# Patient Record
Sex: FEMALE | Race: ASIAN | NOT HISPANIC OR LATINO
[De-identification: names, ages, dates, MRNs, and addresses within clinical notes are randomized per-mention and may not be internally consistent; named-entity substitution may affect disease eponyms.]

---

## 2018-03-05 ENCOUNTER — APPOINTMENT (OUTPATIENT)
Dept: OPHTHALMOLOGY | Facility: CLINIC | Age: 68
End: 2018-03-05
Payer: MEDICARE

## 2018-03-05 PROBLEM — Z00.00 ENCOUNTER FOR PREVENTIVE HEALTH EXAMINATION: Noted: 2018-03-05

## 2018-03-05 PROCEDURE — 92004 COMPRE OPH EXAM NEW PT 1/>: CPT

## 2018-03-06 ENCOUNTER — APPOINTMENT (OUTPATIENT)
Dept: OPHTHALMOLOGY | Facility: CLINIC | Age: 68
End: 2018-03-06

## 2018-03-12 ENCOUNTER — APPOINTMENT (OUTPATIENT)
Dept: OPHTHALMOLOGY | Facility: CLINIC | Age: 68
End: 2018-03-12
Payer: MEDICARE

## 2018-03-12 PROCEDURE — 92012 INTRM OPH EXAM EST PATIENT: CPT

## 2018-04-02 ENCOUNTER — APPOINTMENT (OUTPATIENT)
Dept: OPHTHALMOLOGY | Facility: CLINIC | Age: 68
End: 2018-04-02

## 2018-07-24 ENCOUNTER — APPOINTMENT (OUTPATIENT)
Dept: OBGYN | Facility: CLINIC | Age: 68
End: 2018-07-24

## 2018-07-24 ENCOUNTER — APPOINTMENT (OUTPATIENT)
Dept: OBGYN | Facility: CLINIC | Age: 68
End: 2018-07-24
Payer: MEDICARE

## 2018-07-24 VITALS
HEART RATE: 74 BPM | DIASTOLIC BLOOD PRESSURE: 74 MMHG | HEIGHT: 59 IN | WEIGHT: 146 LBS | BODY MASS INDEX: 29.43 KG/M2 | OXYGEN SATURATION: 97 % | TEMPERATURE: 97.9 F | SYSTOLIC BLOOD PRESSURE: 122 MMHG

## 2018-07-24 DIAGNOSIS — Z78.9 OTHER SPECIFIED HEALTH STATUS: ICD-10-CM

## 2018-07-24 DIAGNOSIS — Z82.61 FAMILY HISTORY OF ARTHRITIS: ICD-10-CM

## 2018-07-24 DIAGNOSIS — Z85.3 PERSONAL HISTORY OF MALIGNANT NEOPLASM OF BREAST: ICD-10-CM

## 2018-07-24 DIAGNOSIS — Z86.79 PERSONAL HISTORY OF OTHER DISEASES OF THE CIRCULATORY SYSTEM: ICD-10-CM

## 2018-07-24 DIAGNOSIS — Z86.39 PERSONAL HISTORY OF OTHER ENDOCRINE, NUTRITIONAL AND METABOLIC DISEASE: ICD-10-CM

## 2018-07-24 PROCEDURE — 99397 PER PM REEVAL EST PAT 65+ YR: CPT

## 2018-07-24 RX ORDER — ATENOLOL 50 MG/1
50 TABLET ORAL
Refills: 0 | Status: ACTIVE | COMMUNITY

## 2018-07-24 RX ORDER — ROSUVASTATIN CALCIUM 10 MG/1
10 TABLET, FILM COATED ORAL
Refills: 0 | Status: ACTIVE | COMMUNITY

## 2018-07-24 RX ORDER — IRON HEME POLYPEPTIDE/FOLIC AC 12-1MG
125 MCG TABLET ORAL
Refills: 0 | Status: ACTIVE | COMMUNITY

## 2018-07-24 RX ORDER — QUINAPRIL HYDROCHLORIDE 10 MG/1
10 TABLET, FILM COATED ORAL
Refills: 0 | Status: ACTIVE | COMMUNITY

## 2018-07-25 LAB
C TRACH RRNA SPEC QL NAA+PROBE: NOT DETECTED
CANDIDA VAG CYTO: NOT DETECTED
G VAGINALIS+PREV SP MTYP VAG QL MICRO: NOT DETECTED
HPV HIGH+LOW RISK DNA PNL CVX: NOT DETECTED
N GONORRHOEA RRNA SPEC QL NAA+PROBE: NOT DETECTED
SOURCE TP AMPLIFICATION: NORMAL
T VAGINALIS VAG QL WET PREP: NOT DETECTED

## 2018-07-26 LAB — BACTERIA UR CULT: NORMAL

## 2018-08-01 LAB — CYTOLOGY CVX/VAG DOC THIN PREP: NORMAL

## 2018-09-18 ENCOUNTER — APPOINTMENT (OUTPATIENT)
Dept: OBGYN | Facility: CLINIC | Age: 68
End: 2018-09-18

## 2018-09-20 ENCOUNTER — APPOINTMENT (OUTPATIENT)
Dept: OBGYN | Facility: CLINIC | Age: 68
End: 2018-09-20
Payer: MEDICARE

## 2018-09-20 ENCOUNTER — APPOINTMENT (OUTPATIENT)
Dept: UROLOGY | Facility: CLINIC | Age: 68
End: 2018-09-20
Payer: MEDICARE

## 2018-09-20 VITALS
OXYGEN SATURATION: 98 % | WEIGHT: 145 LBS | BODY MASS INDEX: 29.23 KG/M2 | HEIGHT: 59 IN | SYSTOLIC BLOOD PRESSURE: 122 MMHG | DIASTOLIC BLOOD PRESSURE: 80 MMHG | TEMPERATURE: 98 F | HEART RATE: 84 BPM

## 2018-09-20 VITALS
SYSTOLIC BLOOD PRESSURE: 122 MMHG | TEMPERATURE: 98 F | OXYGEN SATURATION: 98 % | HEIGHT: 59 IN | WEIGHT: 145 LBS | BODY MASS INDEX: 29.23 KG/M2 | DIASTOLIC BLOOD PRESSURE: 80 MMHG | HEART RATE: 84 BPM

## 2018-09-20 DIAGNOSIS — Z01.419 ENCOUNTER FOR GYNECOLOGICAL EXAMINATION (GENERAL) (ROUTINE) W/OUT ABNORMAL FINDINGS: ICD-10-CM

## 2018-09-20 DIAGNOSIS — Z78.9 OTHER SPECIFIED HEALTH STATUS: ICD-10-CM

## 2018-09-20 DIAGNOSIS — M54.5 LOW BACK PAIN: ICD-10-CM

## 2018-09-20 DIAGNOSIS — G89.29 LOW BACK PAIN: ICD-10-CM

## 2018-09-20 DIAGNOSIS — Z11.3 ENCOUNTER FOR SCREENING FOR INFECTIONS WITH A PREDOMINANTLY SEXUAL MODE OF TRANSMISSION: ICD-10-CM

## 2018-09-20 PROCEDURE — 51798 US URINE CAPACITY MEASURE: CPT

## 2018-09-20 PROCEDURE — 99203 OFFICE O/P NEW LOW 30 MIN: CPT | Mod: 25

## 2018-09-20 PROCEDURE — 99213 OFFICE O/P EST LOW 20 MIN: CPT

## 2018-09-20 RX ORDER — TERCONAZOLE 8 MG/G
0.8 CREAM VAGINAL
Qty: 1 | Refills: 0 | Status: DISCONTINUED | COMMUNITY
Start: 2018-07-24 | End: 2018-09-20

## 2018-09-20 RX ORDER — GLYCERIN/MIN OIL/POLYCARBOPHIL
GEL WITH APPLICATOR (GRAM) VAGINAL
Qty: 1 | Refills: 2 | Status: ACTIVE | COMMUNITY
Start: 2018-09-20 | End: 1900-01-01

## 2018-09-27 LAB
BACTERIA UR CULT: NORMAL
CANDIDA VAG CYTO: NOT DETECTED
G VAGINALIS+PREV SP MTYP VAG QL MICRO: NOT DETECTED
T VAGINALIS VAG QL WET PREP: NOT DETECTED

## 2018-10-04 ENCOUNTER — OTHER (OUTPATIENT)
Age: 68
End: 2018-10-04

## 2018-10-04 LAB
APPEARANCE: CLEAR
BACTERIA: NEGATIVE
BILIRUBIN URINE: NEGATIVE
BLOOD URINE: NEGATIVE
COLOR: YELLOW
GLUCOSE QUALITATIVE U: NEGATIVE MG/DL
KETONES URINE: NEGATIVE
LEUKOCYTE ESTERASE URINE: NEGATIVE
MICROSCOPIC-UA: NORMAL
NITRITE URINE: NEGATIVE
PH URINE: 7
PROTEIN URINE: NEGATIVE MG/DL
RED BLOOD CELLS URINE: 1 /HPF
SPECIFIC GRAVITY URINE: 1
SQUAMOUS EPITHELIAL CELLS: 0 /HPF
UROBILINOGEN URINE: NEGATIVE MG/DL
WHITE BLOOD CELLS URINE: 0 /HPF

## 2018-10-25 ENCOUNTER — APPOINTMENT (OUTPATIENT)
Dept: UROLOGY | Facility: CLINIC | Age: 68
End: 2018-10-25
Payer: MEDICARE

## 2018-10-25 VITALS — SYSTOLIC BLOOD PRESSURE: 140 MMHG | DIASTOLIC BLOOD PRESSURE: 80 MMHG

## 2018-10-25 PROCEDURE — 99213 OFFICE O/P EST LOW 20 MIN: CPT

## 2018-10-25 RX ORDER — OXYBUTYNIN CHLORIDE 5 MG/1
5 TABLET, EXTENDED RELEASE ORAL DAILY
Qty: 90 | Refills: 3 | Status: ACTIVE | COMMUNITY
Start: 2018-10-04 | End: 1900-01-01

## 2018-10-26 RX ORDER — KETOCONAZOLE 20.5 MG/ML
2 SHAMPOO, SUSPENSION TOPICAL
Qty: 120 | Refills: 0 | Status: COMPLETED | COMMUNITY
Start: 2018-10-04

## 2018-10-26 RX ORDER — CROMOLYN SODIUM 40 MG/ML
4 SOLUTION/ DROPS OPHTHALMIC
Qty: 10 | Refills: 0 | Status: COMPLETED | COMMUNITY
Start: 2018-10-20

## 2018-10-26 RX ORDER — TRIAMCINOLONE ACETONIDE 1 MG/G
0.1 CREAM TOPICAL
Qty: 45 | Refills: 0 | Status: COMPLETED | COMMUNITY
Start: 2018-10-04

## 2018-11-13 ENCOUNTER — APPOINTMENT (OUTPATIENT)
Dept: OBGYN | Facility: CLINIC | Age: 68
End: 2018-11-13
Payer: MEDICARE

## 2018-11-13 VITALS
WEIGHT: 148 LBS | HEART RATE: 68 BPM | HEIGHT: 59 IN | OXYGEN SATURATION: 98 % | SYSTOLIC BLOOD PRESSURE: 150 MMHG | BODY MASS INDEX: 29.84 KG/M2 | DIASTOLIC BLOOD PRESSURE: 80 MMHG | TEMPERATURE: 98.2 F

## 2018-11-13 DIAGNOSIS — R39.9 UNSPECIFIED SYMPTOMS AND SIGNS INVOLVING THE GENITOURINARY SYSTEM: ICD-10-CM

## 2018-11-13 PROCEDURE — 99213 OFFICE O/P EST LOW 20 MIN: CPT

## 2018-11-16 LAB
CANDIDA VAG CYTO: NOT DETECTED
G VAGINALIS+PREV SP MTYP VAG QL MICRO: NOT DETECTED
T VAGINALIS VAG QL WET PREP: NOT DETECTED

## 2019-05-03 ENCOUNTER — APPOINTMENT (OUTPATIENT)
Dept: OBGYN | Facility: CLINIC | Age: 69
End: 2019-05-03
Payer: MEDICARE

## 2019-05-03 VITALS
BODY MASS INDEX: 30.24 KG/M2 | DIASTOLIC BLOOD PRESSURE: 70 MMHG | OXYGEN SATURATION: 98 % | WEIGHT: 150 LBS | TEMPERATURE: 98.3 F | HEART RATE: 69 BPM | SYSTOLIC BLOOD PRESSURE: 130 MMHG | HEIGHT: 59 IN

## 2019-05-03 DIAGNOSIS — A60.00 HERPESVIRAL INFECTION OF UROGENITAL SYSTEM, UNSPECIFIED: ICD-10-CM

## 2019-05-03 DIAGNOSIS — R39.9 UNSPECIFIED SYMPTOMS AND SIGNS INVOLVING THE GENITOURINARY SYSTEM: ICD-10-CM

## 2019-05-03 DIAGNOSIS — Z78.0 ASYMPTOMATIC MENOPAUSAL STATE: ICD-10-CM

## 2019-05-03 DIAGNOSIS — N76.0 ACUTE VAGINITIS: ICD-10-CM

## 2019-05-03 DIAGNOSIS — N89.8 OTHER SPECIFIED NONINFLAMMATORY DISORDERS OF VAGINA: ICD-10-CM

## 2019-05-03 DIAGNOSIS — Z85.3 PERSONAL HISTORY OF MALIGNANT NEOPLASM OF BREAST: ICD-10-CM

## 2019-05-03 PROCEDURE — 99213 OFFICE O/P EST LOW 20 MIN: CPT

## 2019-05-03 RX ORDER — VALACYCLOVIR 500 MG/1
500 TABLET, FILM COATED ORAL
Qty: 6 | Refills: 2 | Status: ACTIVE | COMMUNITY
Start: 2019-05-03 | End: 1900-01-01

## 2019-05-03 RX ORDER — ACYCLOVIR 50 MG/G
5 OINTMENT TOPICAL 4 TIMES DAILY
Qty: 1 | Refills: 1 | Status: ACTIVE | COMMUNITY
Start: 2019-05-03 | End: 1900-01-01

## 2019-05-03 NOTE — CHIEF COMPLAINT
[Follow Up] : follow up GYN visit [FreeTextEntry1] : Patient is complaining of per-rectal irritation x 10 days, she has hx/o genital HSV and feels like the same irritation currently.  Patient is also complaining of urinary symptoms along with the rectal irritation.

## 2019-05-03 NOTE — PHYSICAL EXAM
[Awake] : awake [Acute Distress] : no acute distress [Alert] : alert [Oriented x3] : oriented to person, place, and time [Depressed Mood] : not depressed [Flat Affect] : affect not flat [Labia Minora] : labia minora [Labia Majora] : labia major [Normal] : clitoris [Atrophy] : atrophy [Erythema] : no erythema [Discharge] : a  ~M vaginal discharge was present [Green] : green [Scant] : scant [Watery] : watery [No Bleeding] : there was no active vaginal bleeding [FreeTextEntry5] : Affirm sent [FreeTextEntry9] : (+) Nini-rectal lesions - ulcerative lesions, tender to palpation - appears HSV in nature.

## 2019-05-06 ENCOUNTER — APPOINTMENT (OUTPATIENT)
Dept: PULMONOLOGY | Facility: CLINIC | Age: 69
End: 2019-05-06
Payer: MEDICARE

## 2019-05-06 VITALS
DIASTOLIC BLOOD PRESSURE: 67 MMHG | TEMPERATURE: 97 F | RESPIRATION RATE: 16 BRPM | WEIGHT: 148 LBS | BODY MASS INDEX: 29.84 KG/M2 | SYSTOLIC BLOOD PRESSURE: 152 MMHG | OXYGEN SATURATION: 99 % | HEART RATE: 60 BPM | HEIGHT: 59 IN

## 2019-05-06 LAB — BACTERIA UR CULT: NORMAL

## 2019-05-06 PROCEDURE — 94060 EVALUATION OF WHEEZING: CPT

## 2019-05-06 PROCEDURE — 99203 OFFICE O/P NEW LOW 30 MIN: CPT | Mod: 25

## 2019-05-06 PROCEDURE — 94729 DIFFUSING CAPACITY: CPT

## 2019-05-06 PROCEDURE — 94727 GAS DIL/WSHOT DETER LNG VOL: CPT

## 2019-05-06 RX ORDER — GUAIFENESIN 400 MG/1
400 TABLET ORAL EVERY 4 HOURS
Qty: 2 | Refills: 2 | Status: ACTIVE | COMMUNITY
Start: 2019-05-06 | End: 1900-01-01

## 2019-05-06 RX ORDER — FLUTICASONE FUROATE 100 UG/1
100 POWDER RESPIRATORY (INHALATION) DAILY
Qty: 1 | Refills: 2 | Status: ACTIVE | COMMUNITY
Start: 2019-05-06 | End: 1900-01-01

## 2019-05-06 NOTE — HISTORY OF PRESENT ILLNESS
[FreeTextEntry1] : 68 year old woman reports daytime somnolence and fatigue, poor sleep.\par \par she has had an overnight sleep study about 5 years ago at [facility] that diagnosed sleep apnea.  She has not had follow up and has not been treated.  She currently notes snoring, daytime fatigue.  she also has postnasal drip and GERD.\par \par She reports dyspnea and occasional wheeze.\par \par She reports recent sinus infection. She has rhinitis and sinus problems intermittently\par \par No asthma\par No pneumonia\par no rib fracture\par \par PSH\par double mastectomy\par \par Breast cancer\par \par cervical C4- C7 fusion for arthritis done at [facility].\par \par cataract\par \par cardiac cath negative 2017 per patient\par \par \par PMH\par \par breast cancer, treated with Taxol and Herceptin.  No RT\par \par HTN\par \par \par SH\par never smoked\par ETOH no\par \par \par Allergy\par perfume\par seasonal allergy\par \par NKDA\par \par lives in [place] in an apartment on [address] floor.  No mold\par \par

## 2019-05-06 NOTE — REVIEW OF SYSTEMS
[Fever] : no fever [Nasal Congestion] : nasal congestion [Chills] : no chills [Postnasal Drip] : postnasal drip [Dry Mouth] : no dry mouth [Sinus Problems] : sinus problems [Cough] : cough [Sputum] : not coughing up ~M sputum [Dyspnea] : no dyspnea [Dysrhythmia] : no dysrhythmia [Chest Tightness] : no chest tightness [Hypertension] : ~T hypertension [Edema] : ~T edema was not present [Heartburn] : heartburn [Reflux] : reflux [Abdominal Pain] : no abdominal pain

## 2019-05-06 NOTE — PHYSICAL EXAM
[General Appearance - Well Developed] : well developed [Normal Appearance] : normal appearance [General Appearance - Well Nourished] : well nourished [No Deformities] : no deformities [Enlarged Base of the Tongue] : enlargement of the base of the tongue [Neck Appearance] : the appearance of the neck was normal [Neck Cervical Mass (___cm)] : no neck mass was observed [Jugular Venous Distention Increased] : there was no jugular-venous distention [Heart Rate And Rhythm] : heart rate and rhythm were normal [Heart Sounds] : normal S1 and S2 [Murmurs] : no murmurs present [Arterial Pulses Normal] : the arterial pulses were normal [Respiration, Rhythm And Depth] : normal respiratory rhythm and effort [Exaggerated Use Of Accessory Muscles For Inspiration] : no accessory muscle use [Auscultation Breath Sounds / Voice Sounds] : lungs were clear to auscultation bilaterally [Kyphosis] : kyphosis [Bowel Sounds] : normal bowel sounds [Abdomen Soft] : soft [Abdomen Tenderness] : non-tender [] : no hepato-splenomegaly [Nail Clubbing] : no clubbing of the fingernails [Elongated Uvula] : no elongated uvula [Low Lying Soft Palate] : no low lying soft palate [Normal Oropharynx] : abnormal oropharynx [Erythema] : no erythema of the pharynx [FreeTextEntry1] : prominent tonsils

## 2019-05-06 NOTE — DISCUSSION/SUMMARY
[FreeTextEntry1] : 68 year old woman with poor sleep , daytime somnolence and history of JOVANA\par \par obstructive  airways disease, possible allergic asthma\par \par GERD\par \par sinus problems, postnasal drip\par \par \par PLAN\par \par overnight sleep study, split night if able\par inhaled steroid, Arnuity once daily\par \par albuterol MDI twice per day\par \par continue PPI as needed, reevaluate\par \par saline nasal wash\par \par [facility]

## 2019-05-06 NOTE — PROCEDURE
[FreeTextEntry1] : PFT\par \par reduced FEV and FEV1 with normal ratio\par \par diminished flow at FEF 25-75\par \par positive bronchodilator response\par \par normal lung volumes\par \par diminished DLCO\par \par Maurilio Ramirez MD [facility] \par \par

## 2019-05-07 RX ORDER — FLUTICASONE PROPIONATE 250 UG/1
250 POWDER, METERED RESPIRATORY (INHALATION) TWICE DAILY
Qty: 1 | Refills: 3 | Status: ACTIVE | COMMUNITY
Start: 2019-05-07 | End: 1900-01-01

## 2019-06-29 ENCOUNTER — APPOINTMENT (OUTPATIENT)
Dept: SLEEP CENTER | Facility: CLINIC | Age: 69
End: 2019-06-29
Payer: MEDICARE

## 2019-06-29 ENCOUNTER — OUTPATIENT (OUTPATIENT)
Dept: OUTPATIENT SERVICES | Facility: HOSPITAL | Age: 69
LOS: 1 days | End: 2019-06-29
Payer: MEDICARE

## 2019-06-29 PROCEDURE — 95810 POLYSOM 6/> YRS 4/> PARAM: CPT | Mod: 26

## 2019-07-01 DIAGNOSIS — G47.33 OBSTRUCTIVE SLEEP APNEA (ADULT) (PEDIATRIC): ICD-10-CM

## 2019-11-03 ENCOUNTER — APPOINTMENT (OUTPATIENT)
Dept: SLEEP CENTER | Facility: CLINIC | Age: 69
End: 2019-11-03
Payer: MEDICARE

## 2019-11-03 ENCOUNTER — OUTPATIENT (OUTPATIENT)
Dept: OUTPATIENT SERVICES | Facility: HOSPITAL | Age: 69
LOS: 1 days | End: 2019-11-03
Payer: MEDICARE

## 2019-11-03 PROCEDURE — 95811 POLYSOM 6/>YRS CPAP 4/> PARM: CPT | Mod: 26

## 2019-11-04 DIAGNOSIS — G47.33 OBSTRUCTIVE SLEEP APNEA (ADULT) (PEDIATRIC): ICD-10-CM

## 2020-12-21 PROBLEM — N76.0 ACUTE VAGINITIS: Status: RESOLVED | Noted: 2018-07-24 | Resolved: 2020-12-21

## 2021-09-05 ENCOUNTER — EMERGENCY (EMERGENCY)
Facility: HOSPITAL | Age: 71
LOS: 0 days | Discharge: HOME | End: 2021-09-05
Payer: OTHER MISCELLANEOUS

## 2021-09-05 VITALS
TEMPERATURE: 98 F | HEART RATE: 78 BPM | WEIGHT: 145.06 LBS | OXYGEN SATURATION: 99 % | SYSTOLIC BLOOD PRESSURE: 127 MMHG | DIASTOLIC BLOOD PRESSURE: 62 MMHG | RESPIRATION RATE: 19 BRPM | HEIGHT: 59 IN

## 2021-09-05 DIAGNOSIS — M25.511 PAIN IN RIGHT SHOULDER: ICD-10-CM

## 2021-09-05 DIAGNOSIS — Z86.73 PERSONAL HISTORY OF TRANSIENT ISCHEMIC ATTACK (TIA), AND CEREBRAL INFARCTION WITHOUT RESIDUAL DEFICITS: ICD-10-CM

## 2021-09-05 PROCEDURE — 99285 EMERGENCY DEPT VISIT HI MDM: CPT

## 2021-09-05 PROCEDURE — 73030 X-RAY EXAM OF SHOULDER: CPT | Mod: 26,RT

## 2021-09-05 PROCEDURE — G1004: CPT

## 2021-09-05 PROCEDURE — 72125 CT NECK SPINE W/O DYE: CPT | Mod: 26,ME

## 2021-09-05 PROCEDURE — 93971 EXTREMITY STUDY: CPT | Mod: 26,RT

## 2021-09-05 PROCEDURE — 73060 X-RAY EXAM OF HUMERUS: CPT | Mod: 26,RT

## 2021-09-05 PROCEDURE — 73090 X-RAY EXAM OF FOREARM: CPT | Mod: 26,RT

## 2021-09-05 PROCEDURE — 73110 X-RAY EXAM OF WRIST: CPT | Mod: 26,RT

## 2021-09-05 PROCEDURE — 73070 X-RAY EXAM OF ELBOW: CPT | Mod: 26,RT

## 2021-09-05 RX ORDER — HYDROMORPHONE HYDROCHLORIDE 2 MG/ML
1 INJECTION INTRAMUSCULAR; INTRAVENOUS; SUBCUTANEOUS ONCE
Refills: 0 | Status: DISCONTINUED | OUTPATIENT
Start: 2021-09-05 | End: 2021-09-05

## 2021-09-05 RX ADMIN — HYDROMORPHONE HYDROCHLORIDE 1 MILLIGRAM(S): 2 INJECTION INTRAMUSCULAR; INTRAVENOUS; SUBCUTANEOUS at 11:53

## 2021-09-05 NOTE — ED PROVIDER NOTE - MUSCULOSKELETAL, MLM
Spine appears normal, right shoulder TTP anteriorly, no erythema, limited ROM and strength due to pain, sensation, NVI.

## 2021-09-05 NOTE — CONSULT NOTE ADULT - SUBJECTIVE AND OBJECTIVE BOX
71F hx right rotator cuff repair at [facility] on 6/17/21 and CVA with residual right sided deficits who presents for evaluation of severe right shoulder pain. She reports that she had normal post operative pain for 2-3 weeks following the procedure. She states that she advanced to strengthening exercises with physical therapy this past Thursday, 3 days ago. She reports that the next morning she woke up with severe pain in the shoulder. Patient states that the shoulder is resultant from workman's comp injury when she worked in the [facility] [employer] [facility] [employer] [facility] back in 2017 but is now retired, however reports that she retired 2014. Today patient complains of pain to her entire right upper extremity with any movement. Patient also complains of numbness/tingling in the arm. She reports that she also has neck pain and has history of ACDF also performed at [facility]. Otherwise she denies fall/trauma, fever/chills, any other acute orthopedic complaints.     Vital Signs Last 24 Hrs  T(C): 36.4 (05 Sep 2021 09:57), Max: 36.4 (05 Sep 2021 09:57)  T(F): 97.5 (05 Sep 2021 09:57), Max: 97.5 (05 Sep 2021 09:57)  HR: 78 (05 Sep 2021 09:57) (78 - 78)  BP: 127/62 (05 Sep 2021 09:57) (127/62 - 127/62)  BP(mean): --  RR: 19 (05 Sep 2021 09:57) (19 - 19)  SpO2: 99% (05 Sep 2021 09:57) (99% - 99%)    Exam:  General: Awake alert sleeping upon initially entering room but then tearful in painful distress  RUE:   Skin intact. Well healing arthroscopic incisions x4 without surrounding erythema or evidence of infection. No obvious abrasions/lacerations appreciated. No gross deformity.   No palpable effusion. No increased warmth.   TTP throughout entire right upper extremity from medial clavicle, to proximal humerus, to elbow/wrist/hand.   Limited AROM of wrist/elbow secondary to pain. Refuses AROM of shoulder. Limited PROM of elbow/hand secondary to pain. Very limited PROM of shoulder secondary to pain.   +Ax/Msc/Rad/Uln/Med/AIN/PIN grossly intact, SILT  Radial/ulnar/brachial pulses intact  Compartments soft and compressible  No calf TTP bilaterally    XR imaging reviewed with no acute bony abnormality of shoulder appreciated

## 2021-09-05 NOTE — ED ADULT NURSE NOTE - CHIEF COMPLAINT QUOTE
Rotator cuff SX 6/17 Dr. Idris Rooney at [place]  Worsening Right arm/shoulder pain since Friday  4am last Tramadol 50mg

## 2021-09-05 NOTE — ED ADULT TRIAGE NOTE - CHIEF COMPLAINT QUOTE
Rotator cuff SX 6/17 Dr. Idris Rooney at [place]  Worsening Right arm/shoulder pain since Friday  4am last Tramadol 50mg

## 2021-09-05 NOTE — ED PROVIDER NOTE - PATIENT PORTAL LINK FT
You can access the FollowMyHealth Patient Portal offered by [facility] by registering at the following website: [email or web address]. By joining [facility]’s FollowMyHealth portal, you will also be able to view your health information using other applications (apps) compatible with our system.

## 2021-09-05 NOTE — ED ADULT NURSE NOTE - OBJECTIVE STATEMENT
pt presented to Er with complaints of right shoulder pain s/p rotator cuff surgery june 7thy. pt states PT helped until recently when the pain increased and she is unable to move extremity. hx of stroke; right side deficit

## 2021-09-05 NOTE — ED PROVIDER NOTE - OBJECTIVE STATEMENT
70 y/o F presents to ED c/o right shoulder pain x 2 days pt is s/p rotator cuff surgery 2 months ago, currently on therapy stated that pain started suddenly and does not know the cause no associated symptoms denies trauma, numbness and other concerns.

## 2021-09-05 NOTE — CONSULT NOTE ADULT - ASSESSMENT
71F with right shoulder pain    Plan:  XR right shoulder reviewed without acute bony pathology appreciated  Patient's exam inconsistent with re-tear of rotator cuff as she has TTP at forearm/wrist/medial clavicle, as well as limited wrist extension with active motion. Low concern for infection given afebrile, no erythema/warmth. Will obtain additional imaging to r/o other pathology.  Recommend XR imaging of right humerus/elbow/forearm/wrist to r/o underlying pathology  Recommend XR and CT of cervical spine given patient's history of ACDF and complaint of pain in her neck, numbness/tingling in her arm.   Recommend pain control PRN  Sling to be provided for comfort  No indication for emergent MRI of her shoulder at this time, as can be performed as outpatient if recommended by her orthopedic surgeon  If imaging does not reveal acute abnormality, will recommend discharge with follow up at [facility] with orthopedic surgeon who performed recent rotator cuff repair.   Will update recommendations after evaluation of subsequent imaging.   Will discuss with attending Dr. Santos and advise if any changes to plan 71F with right shoulder pain    Plan:  XR right shoulder reviewed without acute bony pathology appreciated  Patient's exam inconsistent with re-tear of rotator cuff as she has TTP at forearm/wrist/medial clavicle, as well as limited wrist extension with active motion. Low concern for infection given afebrile, no erythema/warmth. Will obtain additional imaging to r/o other pathology.  Recommend XR imaging of right humerus/elbow/forearm/wrist to r/o underlying pathology  Recommend XR and CT of cervical spine given patient's history of ACDF and complaint of pain in her neck, numbness/tingling in her arm.   Recommend pain control PRN  Sling to be provided for comfort  No indication for emergent MRI of her shoulder at this time, as can be performed as outpatient if recommended by her orthopedic surgeon  If imaging does not reveal acute abnormality, will recommend discharge with follow up at [facility] with orthopedic surgeon who performed recent rotator cuff repair.   Will update recommendations after evaluation of subsequent imaging.   Will discuss with attending Dr. Santos and advise if any changes to plan    Addendum: XR and CT imaging obtained and reviewed without any obvious acute bony pathology appreciated. Recommend discharge for follow up and further management per primary orthopedic surgeon. Sling can be used for comfort as needed but given no bony pathology identified not required. Discussed with Dr. Santos who agrees with treatment plan.

## 2021-09-05 NOTE — ED PROVIDER NOTE - CLINICAL SUMMARY MEDICAL DECISION MAKING FREE TEXT BOX
70 y/o F with right pain likely arthritis pt is post rotator cuff repair 2 months, xrays, CT scan negative, seen by ortho pt in NAD will continue tramadol as prescribed and f/u with private orthopedist

## 2021-09-17 ENCOUNTER — OUTPATIENT (OUTPATIENT)
Dept: OUTPATIENT SERVICES | Facility: HOSPITAL | Age: 71
LOS: 1 days | Discharge: STILL ADMITTED OR ADMINISTRATIVE | End: 2021-09-17

## 2021-09-20 DIAGNOSIS — F33.3 MAJOR DEPRESSIVE DISORDER, RECURRENT, SEVERE WITH PSYCHOTIC SYMPTOMS: ICD-10-CM

## 2021-11-17 ENCOUNTER — OUTPATIENT (OUTPATIENT)
Dept: OUTPATIENT SERVICES | Facility: HOSPITAL | Age: 71
LOS: 1 days | Discharge: HOME | End: 2021-11-17
Payer: MEDICARE

## 2021-11-17 PROCEDURE — 90792 PSYCH DIAG EVAL W/MED SRVCS: CPT | Mod: 95

## 2021-11-18 DIAGNOSIS — F43.21 ADJUSTMENT DISORDER WITH DEPRESSED MOOD: ICD-10-CM

## 2022-01-03 PROCEDURE — 99214 OFFICE O/P EST MOD 30 MIN: CPT | Mod: 95

## 2022-07-06 ENCOUNTER — APPOINTMENT (OUTPATIENT)
Dept: PULMONOLOGY | Facility: CLINIC | Age: 72
End: 2022-07-06

## 2022-07-06 VITALS
OXYGEN SATURATION: 98 % | SYSTOLIC BLOOD PRESSURE: 124 MMHG | WEIGHT: 139 LBS | HEART RATE: 56 BPM | DIASTOLIC BLOOD PRESSURE: 70 MMHG | BODY MASS INDEX: 28.07 KG/M2 | TEMPERATURE: 98.6 F

## 2022-07-06 PROCEDURE — 99214 OFFICE O/P EST MOD 30 MIN: CPT

## 2022-07-06 RX ORDER — ALBUTEROL SULFATE 90 UG/1
108 (90 BASE) INHALANT RESPIRATORY (INHALATION)
Qty: 1 | Refills: 3 | Status: ACTIVE | COMMUNITY
Start: 2019-05-07 | End: 1900-01-01

## 2022-07-06 NOTE — PHYSICAL EXAM
[No Acute Distress] : no acute distress [Well Nourished] : well nourished [Well Developed] : well developed [Normal Appearance] : normal appearance [No Neck Mass] : no neck mass [No JVD] : no jvd [Normal Rate/Rhythm] : normal rate/rhythm [No Resp Distress] : no resp distress [Clear to Auscultation Bilaterally] : clear to auscultation bilaterally [Benign] : benign [No Masses] : no masses [Soft] : soft [Normal Bowel Sounds] : normal bowel sounds [No Clubbing] : no clubbing [No Edema] : no edema [Oriented x3] : oriented x3

## 2022-07-07 NOTE — DISCUSSION/SUMMARY
[FreeTextEntry1] : 71 year old woman with poor sleep , daytime somnolence and history of JOVANA\par \par She has been using CPAP since 2019 but states CPAP machine not functioning and unable to be repaired\par \par obstructive airways disease, possible allergic asthma\par \par GERD\par \par sinus problems, postnasal drip\par \par CT chest at [facility] 6/23/22 demonstrated 5 mm part solid nodule in EDGARDO.  Also right basilar atelectasis.  No bronchiectasis\par \par She has also been diagnosed with primary biliary cirrhosis \par She has  history of myasthenia gravis and breast cancer.\par \par She reports cough and postnasal drip, using Flonase\par \par  history of MS, CVA x3 hospitalized at [facility], breast cancer, completed treatment\par \par \par PLAN\par Repeat CT chest in 6 months\par needs PFT\par \par May use albuterol MDI twice per day\par \par continue PPI as needed, reevaluate\par \par saline nasal wash, Flonase\par \par Attempt to reorder CPAP\par \par contact PMD for records\par \par review [facility] records\par \par Total time spent : 30 minutes\par Including:\par Preparation prior to visit - Reviewing prior record, results of tests and Consultation Reports as applicable\par Conducting an appropriate H & P during today's encounter\par Appropriate orders for tests, medications and procedures, as applicable\par Counseling patient \par Note completion \par \par Maurilio Ramirez MD [facility]. \par

## 2022-07-07 NOTE — HISTORY OF PRESENT ILLNESS
[TextBox_4] : 68 year old woman reports daytime somnolence and fatigue, poor sleep.\par \par She has had an overnight sleep study about 5 years ago at [facility] that diagnosed sleep apnea. She has not had follow up and has not been treated. She currently notes snoring, daytime fatigue. She also has postnasal drip and GERD.\par \par She reports dyspnea and occasional wheeze.\par \par She reports recent sinus infection. She has rhinitis and sinus problems intermittently\par \par No asthma\par No pneumonia\par no rib fracture\par \par PSH\par double mastectomy\par \par Breast cancer\par \par cervical C4- C7 fusion for arthritis done at [facility].\par \par cataract\par \par cardiac cath negative 2017 per patient\par \par \par PMH\par \par breast cancer, treated with Taxol and Herceptin. No RT\par \par HTN\par \par \par SH\par never smoked\par ETOH no\par \par \par Allergy\par perfume\par seasonal allergy\par \par NKDA\par \par lives in [place] in an apartment on [address] floor. No mold\par

## 2022-07-26 ENCOUNTER — APPOINTMENT (OUTPATIENT)
Dept: PULMONOLOGY | Facility: CLINIC | Age: 72
End: 2022-07-26

## 2022-07-26 VITALS
HEIGHT: 59 IN | WEIGHT: 140 LBS | BODY MASS INDEX: 28.22 KG/M2 | OXYGEN SATURATION: 97 % | TEMPERATURE: 98.2 F | HEART RATE: 62 BPM

## 2022-07-26 DIAGNOSIS — G47.33 OBSTRUCTIVE SLEEP APNEA (ADULT) (PEDIATRIC): ICD-10-CM

## 2022-07-26 PROCEDURE — 99214 OFFICE O/P EST MOD 30 MIN: CPT

## 2022-07-26 NOTE — HISTORY OF PRESENT ILLNESS
[TextBox_4] : 68 year old woman reports daytime somnolence and fatigue, poor sleep.\par \par She has had an overnight sleep study about 5 years ago at [facility] that diagnosed sleep apnea. She has not had follow up and has not been treated. She currently notes snoring, daytime fatigue. She also has postnasal drip and GERD.\par \par She reports dyspnea and occasional wheeze.\par \par She has  history of  sinus infection. She has rhinitis and sinus problems intermittently\par \par \par She had CT chest  that demonstrated EDGARDO part solid nodule\par \par She has been diagnosed with myasthenia gravis\par \par She was hospitalized t [facility]\par \par No asthma\par No pneumonia\par no rib fracture\par \par PSH\par double mastectomy\par \par Breast cancer\par \par cervical C4- C7 fusion for arthritis done at [facility].\par \par cataract\par \par cardiac cath negative 2017 per patient\par \par \par PMH\par \par breast cancer, treated with Taxol and Herceptin. No RT\par \par HTN\par \par \par SH\par never smoked\par ETOH no\par \par \par Allergy\par perfume\par seasonal allergy\par \par NKDA\par \par lives in [place] in an apartment on [address] floor. No mold\par

## 2022-07-26 NOTE — DISCUSSION/SUMMARY
[FreeTextEntry1] : 71 year old woman with poor sleep , daytime somnolence and history of JOVANA\par \par She has been using CPAP since 2019 but states CPAP machine not functioning and unable to be repaired\par \par obstructive airways disease, possible allergic asthma\par \par Lung nodule, sub solid noted on CT chest  06/23/22, left upper lobe.  Done at [facility]\par \par GERD\par \par Myasthenia Gravis\par \par sinus problems, postnasal drip\par \par CT chest at [facility] 6/23/22 demonstrated 5 mm part solid nodule in EDGARDO.  Also right basilar atelectasis.  No bronchiectasis\par \par She has also been diagnosed with primary biliary cirrhosis \par She has  history of myasthenia gravis and breast cancer.  managed at [facility] for MG.\par \par She reports cough and postnasal drip, using Flonase\par \par   CVA x3 hospitalized at [facility], breast cancer, completed treatment\par \par \par PLAN\par Repeat CT chest in 6 months\par needs PFT\par \par May use albuterol MDI twice per day\par \par continue PPI as needed, reevaluate\par \par saline nasal wash, Flonase\par \par She received CPAP in past.  She states it is not working and has not been replaced.  \par \par Attempt to reorder CPAP.  \par \par contact PMD for records\par \par Return for PFT\par \par review [facility] records\par \par Total time spent : 30 minutes\par Including:\par Preparation prior to visit - Reviewing prior record, results of tests and Consultation Reports as applicable\par Conducting an appropriate H & P during today's encounter\par Appropriate orders for tests, medications and procedures, as applicable\par Counseling patient \par Note completion \par \par [facility] \par

## 2022-07-27 ENCOUNTER — APPOINTMENT (OUTPATIENT)
Dept: PULMONOLOGY | Facility: CLINIC | Age: 72
End: 2022-07-27

## 2022-08-30 ENCOUNTER — APPOINTMENT (OUTPATIENT)
Dept: PULMONOLOGY | Facility: CLINIC | Age: 72
End: 2022-08-30

## 2022-08-30 VITALS
BODY MASS INDEX: 27.87 KG/M2 | HEART RATE: 70 BPM | TEMPERATURE: 96.6 F | SYSTOLIC BLOOD PRESSURE: 158 MMHG | WEIGHT: 138 LBS | DIASTOLIC BLOOD PRESSURE: 74 MMHG | OXYGEN SATURATION: 98 %

## 2022-08-30 DIAGNOSIS — I10 ESSENTIAL (PRIMARY) HYPERTENSION: ICD-10-CM

## 2022-08-30 DIAGNOSIS — G70.00 MYASTHENIA GRAVIS W/OUT (ACUTE) EXACERBATION: ICD-10-CM

## 2022-08-30 DIAGNOSIS — J44.9 CHRONIC OBSTRUCTIVE PULMONARY DISEASE, UNSPECIFIED: ICD-10-CM

## 2022-08-30 DIAGNOSIS — R91.1 SOLITARY PULMONARY NODULE: ICD-10-CM

## 2022-08-30 DIAGNOSIS — R09.82 POSTNASAL DRIP: ICD-10-CM

## 2022-08-30 DIAGNOSIS — R06.02 SHORTNESS OF BREATH: ICD-10-CM

## 2022-08-30 PROCEDURE — 94729 DIFFUSING CAPACITY: CPT

## 2022-08-30 PROCEDURE — 94060 EVALUATION OF WHEEZING: CPT

## 2022-08-30 PROCEDURE — 94727 GAS DIL/WSHOT DETER LNG VOL: CPT

## 2022-08-30 PROCEDURE — 99214 OFFICE O/P EST MOD 30 MIN: CPT | Mod: 25

## 2022-08-30 RX ORDER — ALBUTEROL SULFATE 90 UG/1
108 (90 BASE) INHALANT RESPIRATORY (INHALATION)
Qty: 1 | Refills: 3 | Status: ACTIVE | COMMUNITY
Start: 2019-05-06 | End: 1900-01-01

## 2022-08-30 RX ORDER — FLUTICASONE PROPIONATE 50 UG/1
50 SPRAY, METERED NASAL DAILY
Qty: 1 | Refills: 2 | Status: ACTIVE | COMMUNITY
Start: 2022-08-30 | End: 1900-01-01

## 2022-08-30 RX ORDER — SODIUM CHLORIDE 2.65%
2.65 AEROSOL, SPRAY (ML) NASAL TWICE DAILY
Qty: 1 | Refills: 2 | Status: ACTIVE | COMMUNITY
Start: 2022-08-30 | End: 1900-01-01

## 2022-08-30 NOTE — HISTORY OF PRESENT ILLNESS
[TextBox_4] : 72 year old woman reports daytime somnolence and fatigue, poor sleep.\par \par She has had an overnight sleep study about 5 years ago at [facility] that diagnosed sleep apnea. She has not had follow up and has not been treated. She currently notes snoring, daytime fatigue. She also has postnasal drip and GERD.\par \par She reports dyspnea and occasional wheeze.\par \par She has  history of  sinus infection. She has rhinitis and sinus problems intermittently\par \par \par She had CT chest  that demonstrated EDGARDO part solid nodule, June 2022.  Follow up ordered\par \par She has been diagnosed with myasthenia gravis\par \par She was hospitalized t [facility]\par \par She has had postnasal drip\par \par She has been using CPAP however device has malfunctioned.  Also, it has been recalled. \par \par She has stable dyspnea on exertion\par \par She has had PFT today \par \par No asthma\par No pneumonia\par no rib fracture\par \par PSH\par double mastectomy\par \par Breast cancer\par \par cervical C4- C7 fusion for arthritis done at [facility].\par \par cataract\par \par cardiac cath negative 2017 per patient\par \par \par PMH\par \par breast cancer, treated with Taxol and Herceptin. No RT\par \par HTN\par \par \par SH\par never smoked\par ETOH no\par \par \par Allergy\par perfume\par seasonal allergy\par \par NKDA\par \par lives in [place] in an apartment on [address]. No mold\par

## 2022-08-30 NOTE — PROCEDURE
[FreeTextEntry1] : PFT\par \par normal spirometry, lung volumes and mild decrease in diffusion\par \par \par  [facility]

## 2022-08-30 NOTE — DISCUSSION/SUMMARY
[FreeTextEntry1] : 72 year old woman with poor sleep , daytime somnolence and history of JOVANA\par \par She has been using CPAP since 2019 but states CPAP machine not functioning and unable to be repaired\par \par obstructive airways disease, possible allergic asthma\par \par Lung nodule, sub solid noted on CT chest  06/23/22, left upper lobe.  Done at [facility]\par \par PFT demonstrated normal spirometry with mild decrease in DLCO, normal DLCO/VA\par \par GERD\par \par Myasthenia Gravis\par \par sinus problems, postnasal drip\par \par CT chest at [facility] 6/23/22 demonstrated 5 mm part solid nodule in EDGARDO.  Also right basilar atelectasis.  No bronchiectasis\par \par She has also been diagnosed with primary biliary cirrhosis \par She has  history of myasthenia gravis and breast cancer.  managed at [facility] for MG.\par \par She reports cough and postnasal drip, using Flonase\par \par   CVA x3 hospitalized at [facility], breast cancer, completed treatment\par \par \par PLAN\par Repeat CT chest in 6 months\par \par \par May use albuterol MDI twice per day as needed \par \par continue PPI as needed, reevaluate\par \par saline nasal wash, Flonase\par \par She received CPAP in past.  She states it is not working and has not been replaced.  \par CPAP has been authorized\par \par Will try to expedite delivery of CPAP\par \par \par reviewed [facility] records\par \par Total time spent : 30 minutes\par Including:\par Preparation prior to visit - Reviewing prior record, results of tests and Consultation Reports as applicable\par Conducting an appropriate H & P during today's encounter\par Appropriate orders for tests, medications and procedures, as applicable\par Counseling patient \par Note completion \par \par Maurilio Ramirez MD [facility]. \par

## 2022-11-22 ENCOUNTER — APPOINTMENT (OUTPATIENT)
Dept: PULMONOLOGY | Facility: CLINIC | Age: 72
End: 2022-11-22

## 2023-02-06 NOTE — ED PROVIDER NOTE - AGGRAVATING FACTORS
movement Detail Level: Detailed Depth Of Biopsy: dermis Was A Bandage Applied: Yes Size Of Lesion In Cm: 0 Biopsy Type: H and E Biopsy Method: Personna blade Anesthesia Type: 1% lidocaine with 1:100,000 epinephrine Anesthesia Volume In Cc (Will Not Render If 0): 1 Hemostasis: Paty's Wound Care: Vaseline Dressing: pressure dressing with telfa Destruction After The Procedure: No Type Of Destruction Used: Electrodesiccation and Curettage Cryotherapy Text: The wound bed was treated with cryotherapy after the biopsy was performed. Electrodesiccation Text: The wound bed was treated with electrodesiccation after the biopsy was performed. Electrodesiccation And Curettage Text: The wound bed was treated with electrodesiccation and curettage after the biopsy was performed. Silver Nitrate Text: The wound bed was treated with silver nitrate after the biopsy was performed. Lab: [facility] Facility: [address] Consent: The provider's intent is to obtain a tissue sample solely for diagnostic purposes. Written consent was obtained and risks were reviewed including but not limited to scarring, infection, bleeding, scabbing, incomplete removal, nerve damage and allergy to anesthesia. Post-Care Instructions: I reviewed with the patient in detail post-care instructions. Patient is to keep the biopsy site dry overnight, and then apply bacitracin twice daily until healed. Patient may apply hydrogen peroxide soaks to remove any crusting. Notification Instructions: Patient will be notified of biopsy results. However, patient instructed to call the office if not contacted within 2 weeks. Billing Type: [employer] Information: Selecting Yes will display possible errors in your note based on the variables you have selected. This validation is only offered as a suggestion for you. PLEASE NOTE THAT THE VALIDATION TEXT WILL BE REMOVED WHEN YOU FINALIZE YOUR NOTE. IF YOU WANT TO FAX A PRELIMINARY NOTE YOU WILL NEED TO TOGGLE THIS TO 'NO' IF YOU DO NOT WANT IT IN YOUR FAXED NOTE.

## 2023-05-12 ENCOUNTER — APPOINTMENT (OUTPATIENT)
Dept: PULMONOLOGY | Facility: CLINIC | Age: 73
End: 2023-05-12

## 2023-07-11 ENCOUNTER — APPOINTMENT (OUTPATIENT)
Dept: PULMONOLOGY | Facility: CLINIC | Age: 73
End: 2023-07-11

## 2023-07-24 NOTE — ED PROVIDER NOTE - NSCAREINITIATED _GEN_ER
Occupational Therapy Daily Treatment and Progress Note   Date: 7/21/2023  Name: Mauricio Sanchez  Clinic Number: [id number]: 5 y.o. 4 m.o.    Therapy Diagnosis:   Encounter Diagnosis   Name Primary?    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 Mom stating that patient has been not feeling well the last few days. Patient with clear drainage from his nose and decreased tolerance of some activities today. However, moms reporting that patient is going up to peers more frequently when they visit the park.     Pain: Child too young to understand and rate pain levels. No pain behaviors or report of pain.   Objective     Mauricio participated in dynamic functional therapeutic activities to improve functional performance for  55  minutes, including:  Sensory Motor Activities/ Neuromuscular activities   Drink from cup during session x 2 during session today due to increase in expectation and oral seeking.   Tactile input with dry paint brush with good tolerance on arms, hands - patient reaching hand forward to give to occupational therapist x 3.   Paint in ziploc bag - tolerated touching the bag today while holding paint brush   Net swing - reclined position and prone - tolerating occupational therapist wrapping him in net and then letting go.    Obstacle course - patient dys-regulated throughout session, did not attempt today. .  Visual Motor  Activities  Increased visual attention to talker. Achieving  finger isolation today  Touching paint brush to ziploc bag with paint inside.         Self Help Activities  Shoes remaining on today.  Regulation and engagement  Drinking sippy cup during and at end of session, handing to occupational therapist and no attempts to throw.        Play activities   joint attention and on the body play - happy and you know it song and motions- patient taping his legs for occupational therapist to touch legs for hip hip hooray    Formal Testing:   The PDMS 2nd Edition is a standardized test which consists of six subtests that measures interrelated motor abilities that develop early in life for ages 0-72 months. The grasping subtest measures a child's ability to use his/her hands. It begins with the ability to hold an object with one hand and progresses to actions involving the controlled use of the fingers of both hands. The visual-motor integration (VMI) subtest measures a child's ability to use his/her visual perceptual skills to perform complex eye-hand coordination tasks, such as reaching and grasping for an object, building with blocks, and copying designs. Standard scores are measured with a mean of 10 and standard deviation of 3.       7/13/2022 Raw Score Standard Score Percentile Age Equivalent Description   Grasping 28 <1 <1 6 months Very Poor   VMI 29 <1 <1 7 months Very Poor      It was difficulty to determine it patient would not or could not perform tasks during this initial assessment. Patient requiring maximal facilitation from skilled therapist to sit for greater than 15 seconds.       7/7/2023 Raw Score Standard Score Percentile Age Equivalent Description   Grasping 37 3 1 11 months Very Poor   VMI 46 <1 <1 10 months Very Poor     7/13/2022 The Sensory Profile 2 provides a standardized tool for evaluating a child's sensory processing patterns in the context of every day life, which provides a unique way to  determine how sensory processing may be contributing to or interfering with participation. It is grouped into 3 main areas: 1) Sensory System scores (general, auditory, visual, touch, movement, body position, oral), 2) Behavioral scores (behavioral, conduct, social emotional, attentional), 3) Sensory pattern scores (seeking/seeker, avoiding/avoider, sensitivity/sensor, registration/bystander). Scores are interpreted as Much Less Than Others, Less Than Others, Just Like the Majority of Others, More Than Others, or More Than Others.            7/13/2022 The Roll Evaluation of Activities of Life (The REAL) is a standardized rating scale that assesses a child's ability to care for themselves at home, at school, and in the community. It includes activities of daily living (ADLs) as well as instrumental activities of daily living (IADLs) for children ages 2 years old to 18 years 11 months old. The REAL standard scores are based on a mean of 100 and standard deviation of 10.     Domain Raw Score Standard Score Percentile   ADLs 94 97.3 36   IADLs 10 93.5 24          Home Exercises and Education Provided     Education provided:   - Caregiver educated on current performance and POC. Caregiver verbalized understanding.  - Sensory motor engagement and communication through eye contact.   - Wheelbarrow walk  - Puree from spoon  - Rock and roll for vestibular input around diaper changes  - massage to back of head and neck  - food inventory  - tactile play 2-3 times per week    Home Exercises Provided: yes.  Exercises were reviewed and caregiver was able to demonstrate them prior to the end of the session and displayed good  understanding of the HEP provided.     See EMR under Patient Instructions for exercises provided  7/7/2023 .       Assessment     Pt was seen for an occupational therapy follow-up session. Pt with good / fair tolerance to session with min/mod facilitation for engagement and exploration of sensory equipment  and input. Mauricio with  decreased regulation throughout most of the session utilizing swing for regulation.  He continues to demonstrate sensory seeking behavior such as proprioception and vestibular input whereby it is impacting his ability to engage in activities of daily living. Noting less aggression to others (pinching) and himself (head banging) the incident today was directly related to communication and frustration. He displays decreased self help and fine motor skills and continues to benefit form skilled occupational therapy at this time.      Mauricio is progressing well towards his goals and updates are listed below. Pt will continue to benefit from skilled outpatient occupational therapy to address the deficits listed in the problem list on initial evaluation to maximize pt's potential level of independence and progress toward age appropriate skills.    Pt prognosis is Good.  Anticipated barriers to occupational therapy:  none at this time  Pt's spiritual, cultural and educational needs considered and pt agreeable to plan of care and goals.    Goals:   Short term goals:  Demonstrate improve self help skills as noted by feeding self 2 bites of food before throwing utensil on 2/3 trials and parent report.  (Initiated 7/13/2022 ) Progressing 7/21/2023  Demonstrate improved sensory processing skills as noted by actively propelling self on platform swing while in prone x 2 revolutions on 2/3 trials. (Initiated 7/13/2022) Progressing 7/21/2023   Demonstrate improved visual motor skills as noted by completing 6 piece shape sorter with set up a on 2/3 trials. Initiated 1/4/23 Met with moderate a. 7/7/2023    Demonstrate improved visual motor skills as noted by completing 6 piece shape sorter with set up a on 2/3 trials. Updated 7/7/2023 Progressing 7/21/2023   Demonstrate improved self help skills as noted by drinking from cup and placing on surface 90% of the time. Initiated 7/7/2023.  Demonstrate improved visual  attention at midline as noted by removing squiz from mirror while prone on ball with moderate a on 2/3 trials. Initiate 1/4/23  Progressing 7/21/2023   7.  Demonstrate improved sensory processing skills as noted by tolerating painting activities with a paintbrush and moderate facilitation on 2/3 trials. Initiated 4/19/2023 Progressing 7/21/2023   8.   Demonstrate improved sensory processing skills as noted by tolerating touching paint inside ziploc bag for 1 minute without dys-regulation on 2/3 trials. Initiated 7/7/2023 Progressing 7/21/2023   Long term goals:  Demonstrate understanding of and report ongoing adherence to home exercise program. (Initiated 7/13/2022) Progressing 7/21/2023   Demonstrate improve self help skills as noted by feeding self 4 bites of food before throwing utensil on 2/3 trials and parent report.   (Initiated 7/13/2022) Progressing 7/21/2023   Demonstrate mproved sensory processing skills as noted by actively propelling self on platform swing while in prone x 4 revolutions on 2/3 trials (Initiated 7/13/2022) Progressing 7/21/2023   Demonstrate improved sensory processing skills as noted by tolerating touching paint for 1 minute using a paint brush without dys-regulation on 2/3 trials. Initiated 7/7/2023      Plan   Certification Period/Plan of care expiration: 7/7/2023 - 1/7/2024  Occupational therapy services will be provided 1-4x/month through direct intervention, parent education and home programming. Therapy will be discontinued when child has met all goals, is not making progress, parent discontinues therapy, and/or for any other applicable reasons    TIFFANIE Garcia  7/21/2023    Gurinder Maya)

## 2024-01-15 ENCOUNTER — APPOINTMENT (OUTPATIENT)
Dept: PULMONOLOGY | Facility: CLINIC | Age: 74
End: 2024-01-15

## 2024-08-26 ENCOUNTER — OUTPATIENT (OUTPATIENT)
Dept: OUTPATIENT SERVICES | Facility: HOSPITAL | Age: 74
LOS: 1 days | Discharge: HOME | End: 2024-08-26
Payer: OTHER MISCELLANEOUS

## 2024-08-26 DIAGNOSIS — M06.9 RHEUMATOID ARTHRITIS, UNSPECIFIED: ICD-10-CM

## 2024-08-26 PROCEDURE — 93970 EXTREMITY STUDY: CPT | Mod: 26

## 2025-04-06 ENCOUNTER — EMERGENCY (EMERGENCY)
Facility: HOSPITAL | Age: 75
LOS: 0 days | Discharge: HOME | End: 2025-04-07
Attending: STUDENT IN AN ORGANIZED HEALTH CARE EDUCATION/TRAINING PROGRAM
Payer: MEDICARE

## 2025-04-06 VITALS
SYSTOLIC BLOOD PRESSURE: 134 MMHG | DIASTOLIC BLOOD PRESSURE: 70 MMHG | HEIGHT: 59 IN | TEMPERATURE: 98 F | RESPIRATION RATE: 18 BRPM | HEART RATE: 91 BPM | OXYGEN SATURATION: 100 % | WEIGHT: 128.31 LBS

## 2025-04-06 DIAGNOSIS — L81.9 DISORDER OF PIGMENTATION, UNSPECIFIED: ICD-10-CM

## 2025-04-06 DIAGNOSIS — Z86.73 PERSONAL HISTORY OF TRANSIENT ISCHEMIC ATTACK (TIA), AND CEREBRAL INFARCTION WITHOUT RESIDUAL DEFICITS: ICD-10-CM

## 2025-04-06 DIAGNOSIS — I10 ESSENTIAL (PRIMARY) HYPERTENSION: ICD-10-CM

## 2025-04-06 DIAGNOSIS — R20.0 ANESTHESIA OF SKIN: ICD-10-CM

## 2025-04-06 DIAGNOSIS — Z79.82 LONG TERM (CURRENT) USE OF ASPIRIN: ICD-10-CM

## 2025-04-06 DIAGNOSIS — R20.2 PARESTHESIA OF SKIN: ICD-10-CM

## 2025-04-06 DIAGNOSIS — M25.461 EFFUSION, RIGHT KNEE: ICD-10-CM

## 2025-04-06 DIAGNOSIS — M25.571 PAIN IN RIGHT ANKLE AND JOINTS OF RIGHT FOOT: ICD-10-CM

## 2025-04-06 DIAGNOSIS — E78.5 HYPERLIPIDEMIA, UNSPECIFIED: ICD-10-CM

## 2025-04-06 LAB
ALBUMIN SERPL ELPH-MCNC: 3.3 G/DL — SIGNIFICANT CHANGE UP (ref 3.3–5)
ALP SERPL-CCNC: 131 U/L — HIGH (ref 40–120)
ALT FLD-CCNC: 48 U/L — SIGNIFICANT CHANGE UP (ref 12–78)
ANION GAP SERPL CALC-SCNC: 5 MMOL/L — SIGNIFICANT CHANGE UP (ref 5–17)
APPEARANCE UR: CLEAR — SIGNIFICANT CHANGE UP
APTT BLD: 33.6 SEC — SIGNIFICANT CHANGE UP (ref 24.5–35.6)
AST SERPL-CCNC: 61 U/L — HIGH (ref 15–37)
BASOPHILS # BLD AUTO: 0.05 K/UL — SIGNIFICANT CHANGE UP (ref 0–0.2)
BASOPHILS NFR BLD AUTO: 1 % — SIGNIFICANT CHANGE UP (ref 0–2)
BILIRUB SERPL-MCNC: 0.5 MG/DL — SIGNIFICANT CHANGE UP (ref 0.2–1.2)
BILIRUB UR-MCNC: NEGATIVE — SIGNIFICANT CHANGE UP
BLD GP AB SCN SERPL QL: SIGNIFICANT CHANGE UP
BUN SERPL-MCNC: 13 MG/DL — SIGNIFICANT CHANGE UP (ref 7–23)
CALCIUM SERPL-MCNC: 10 MG/DL — SIGNIFICANT CHANGE UP (ref 8.5–10.1)
CHLORIDE SERPL-SCNC: 98 MMOL/L — SIGNIFICANT CHANGE UP (ref 96–108)
CK SERPL-CCNC: 241 U/L — HIGH (ref 26–192)
CO2 SERPL-SCNC: 28 MMOL/L — SIGNIFICANT CHANGE UP (ref 22–31)
COLOR SPEC: YELLOW — SIGNIFICANT CHANGE UP
CREAT SERPL-MCNC: 0.85 MG/DL — SIGNIFICANT CHANGE UP (ref 0.5–1.3)
DIFF PNL FLD: NEGATIVE — SIGNIFICANT CHANGE UP
EGFR: 73 ML/MIN/1.73M2 — SIGNIFICANT CHANGE UP
EGFR: 73 ML/MIN/1.73M2 — SIGNIFICANT CHANGE UP
EOSINOPHIL # BLD AUTO: 0.15 K/UL — SIGNIFICANT CHANGE UP (ref 0–0.5)
EOSINOPHIL NFR BLD AUTO: 3 % — SIGNIFICANT CHANGE UP (ref 0–6)
GLUCOSE SERPL-MCNC: 105 MG/DL — HIGH (ref 70–99)
GLUCOSE UR QL: NEGATIVE MG/DL — SIGNIFICANT CHANGE UP
HCT VFR BLD CALC: 33.4 % — LOW (ref 34.5–45)
HGB BLD-MCNC: 11.1 G/DL — LOW (ref 11.5–15.5)
IMM GRANULOCYTES NFR BLD AUTO: 0 % — SIGNIFICANT CHANGE UP (ref 0–0.9)
INR BLD: 1.03 RATIO — SIGNIFICANT CHANGE UP (ref 0.85–1.16)
KETONES UR-MCNC: NEGATIVE MG/DL — SIGNIFICANT CHANGE UP
LACTATE SERPL-SCNC: 1.9 MMOL/L — SIGNIFICANT CHANGE UP (ref 0.7–2)
LEUKOCYTE ESTERASE UR-ACNC: NEGATIVE — SIGNIFICANT CHANGE UP
LYMPHOCYTES # BLD AUTO: 1.16 K/UL — SIGNIFICANT CHANGE UP (ref 1–3.3)
LYMPHOCYTES # BLD AUTO: 23.4 % — SIGNIFICANT CHANGE UP (ref 13–44)
MCHC RBC-ENTMCNC: 30.6 PG — SIGNIFICANT CHANGE UP (ref 27–34)
MCHC RBC-ENTMCNC: 33.2 G/DL — SIGNIFICANT CHANGE UP (ref 32–36)
MCV RBC AUTO: 92 FL — SIGNIFICANT CHANGE UP (ref 80–100)
MONOCYTES # BLD AUTO: 0.52 K/UL — SIGNIFICANT CHANGE UP (ref 0–0.9)
MONOCYTES NFR BLD AUTO: 10.5 % — SIGNIFICANT CHANGE UP (ref 2–14)
NEUTROPHILS # BLD AUTO: 3.07 K/UL — SIGNIFICANT CHANGE UP (ref 1.8–7.4)
NEUTROPHILS NFR BLD AUTO: 62.1 % — SIGNIFICANT CHANGE UP (ref 43–77)
NITRITE UR-MCNC: NEGATIVE — SIGNIFICANT CHANGE UP
NRBC BLD AUTO-RTO: 0 /100 WBCS — SIGNIFICANT CHANGE UP (ref 0–0)
PH UR: 6 — SIGNIFICANT CHANGE UP (ref 5–8)
PLATELET # BLD AUTO: 255 K/UL — SIGNIFICANT CHANGE UP (ref 150–400)
POTASSIUM SERPL-MCNC: 5.2 MMOL/L — SIGNIFICANT CHANGE UP (ref 3.5–5.3)
POTASSIUM SERPL-SCNC: 5.2 MMOL/L — SIGNIFICANT CHANGE UP (ref 3.5–5.3)
PROT SERPL-MCNC: 7.2 GM/DL — SIGNIFICANT CHANGE UP (ref 6–8.3)
PROT UR-MCNC: NEGATIVE MG/DL — SIGNIFICANT CHANGE UP
PROTHROM AB SERPL-ACNC: 11.6 SEC — SIGNIFICANT CHANGE UP (ref 9.9–13.4)
RBC # BLD: 3.63 M/UL — LOW (ref 3.8–5.2)
RBC # FLD: 12.3 % — SIGNIFICANT CHANGE UP (ref 10.3–14.5)
SODIUM SERPL-SCNC: 131 MMOL/L — LOW (ref 135–145)
SP GR SPEC: 1.01 — SIGNIFICANT CHANGE UP (ref 1–1.03)
TROPONIN I, HIGH SENSITIVITY RESULT: 24.1 NG/L — SIGNIFICANT CHANGE UP
UROBILINOGEN FLD QL: 0.2 MG/DL — SIGNIFICANT CHANGE UP (ref 0.2–1)
WBC # BLD: 4.95 K/UL — SIGNIFICANT CHANGE UP (ref 3.8–10.5)
WBC # FLD AUTO: 4.95 K/UL — SIGNIFICANT CHANGE UP (ref 3.8–10.5)

## 2025-04-06 PROCEDURE — 99285 EMERGENCY DEPT VISIT HI MDM: CPT

## 2025-04-06 PROCEDURE — 99222 1ST HOSP IP/OBS MODERATE 55: CPT

## 2025-04-06 PROCEDURE — 70450 CT HEAD/BRAIN W/O DYE: CPT | Mod: 26

## 2025-04-06 PROCEDURE — 73562 X-RAY EXAM OF KNEE 3: CPT | Mod: 26,50

## 2025-04-06 PROCEDURE — 93010 ELECTROCARDIOGRAM REPORT: CPT

## 2025-04-06 PROCEDURE — 73706 CT ANGIO LWR EXTR W/O&W/DYE: CPT | Mod: 26,50

## 2025-04-06 PROCEDURE — 93926 LOWER EXTREMITY STUDY: CPT | Mod: 26,RT

## 2025-04-06 PROCEDURE — 93971 EXTREMITY STUDY: CPT | Mod: 26,RT

## 2025-04-06 RX ORDER — SULFASALAZINE 500 MG/1
2 TABLET ORAL
Refills: 0 | DISCHARGE

## 2025-04-06 RX ORDER — ASPIRIN 325 MG
1 TABLET ORAL
Refills: 0 | DISCHARGE

## 2025-04-06 RX ORDER — ALENDRONATE SODIUM 70 MG/1
1 TABLET ORAL
Refills: 0 | DISCHARGE

## 2025-04-06 RX ORDER — MECLIZINE HCL 12.5 MG
1 TABLET ORAL
Refills: 0 | DISCHARGE

## 2025-04-06 RX ORDER — SIMETHICONE 80 MG
0 TABLET,CHEWABLE ORAL
Refills: 0 | DISCHARGE

## 2025-04-06 RX ORDER — FLUTICASONE PROPIONATE 220 UG/1
1 AEROSOL, METERED RESPIRATORY (INHALATION)
Refills: 0 | DISCHARGE

## 2025-04-06 RX ORDER — ASPIRIN 325 MG
0 TABLET ORAL
Refills: 0 | DISCHARGE

## 2025-04-06 RX ORDER — AZELASTINE HYDROCHLORIDE 137 UG/1
1 SPRAY, METERED NASAL
Refills: 0 | DISCHARGE

## 2025-04-06 RX ORDER — AZELASTINE HYDROCHLORIDE 0.5 MG/ML
1 SOLUTION/ DROPS INTRAOCULAR
Refills: 0 | DISCHARGE

## 2025-04-06 RX ORDER — SIMETHICONE 80 MG
1 TABLET,CHEWABLE ORAL
Refills: 0 | DISCHARGE

## 2025-04-06 RX ORDER — PREDNISONE 20 MG/1
2 TABLET ORAL
Refills: 0 | DISCHARGE

## 2025-04-06 RX ORDER — ATORVASTATIN CALCIUM 80 MG/1
1 TABLET, FILM COATED ORAL
Refills: 0 | DISCHARGE

## 2025-04-06 RX ORDER — HYDROXYCHLOROQUINE SULFATE 200 MG/1
1 TABLET, FILM COATED ORAL
Refills: 0 | DISCHARGE

## 2025-04-06 NOTE — CONSULT NOTE ADULT - NS ATTEND AMEND GEN_ALL_CORE FT
right foot pain after knee injection  cta with runoffs shows three vessel runoff to foot  no evidence of arterial insufficiency

## 2025-04-06 NOTE — CONSULT NOTE ADULT - SUBJECTIVE AND OBJECTIVE BOX
Vascular AttendinF recently had knee injections. Presented to [facility] subsequently sent to [facility] due to decreased ability to ambulate. Patient reports she has been having knee pain since "gel injections" in both feet. Now right foot cold, pain, numbness and tingling. Patient reports taking ASA 81mg. No plavix due to history of hemorrhagic stroke.        PAST MEDICAL & SURGICAL HISTORY:  Rheumatoid arthritis      Syncope      DM (diabetes mellitus)      Calculus of gall bladder and bile duct with nonacute cholecystitis with obstruction      Trigeminal neuralgia    HTN, HLD, UTIs, breast CA    hemorrhagic CVA          Allergies    Allergy Status Unknown    Intolerances        SOCIAL HISTORY:    Vital Signs Last 24 Hrs  T(C): 36.7 (2025 15:26), Max: 36.7 (2025 14:52)  T(F): 98 (2025 15:26), Max: 98.1 (2025 14:52)  HR: 94 (2025 15:26) (91 - 94)  BP: 155/69 (2025 15:26) (134/70 - 155/69)  BP(mean): --  RR: 19 (2025 15:26) (18 - 19)  SpO2: 100% (2025 15:26) (100% - 100%)    Parameters below as of 2025 15:26  Patient On (Oxygen Delivery Method): room air        Gen: NAD  HEENT: NCAT  CV:RRR  Lung: respirations nonlabored  Abd: soft NTND  Extremities: noted painful to palpation b/l knees, noted discoloration of distal right foot compared to left, decreased sensation R foot compared to left across all 5 toe beds, capillary refill slower R compared to L    Pulses: palpable DP/PT/popliteal/femoral pulses b/l, signals strong b/l

## 2025-04-06 NOTE — CONSULT NOTE ADULT - ASSESSMENT
74F from [facility] recent orthopedic injection b/l knee, concern for cold foot.    Plan as discussed with Dr. Kincaid:  - STAT CTA w/ runoff to b/l feet  - CT head  - full labs: CBC, BMP, coags, Type & screen

## 2025-04-06 NOTE — CONSULT NOTE ADULT - SUBJECTIVE AND OBJECTIVE BOX
HPI  74yFemale w/ a c/o bilateral knee pain since her gel shots in both knees last week. Patient had gel shots done for both knees at outpatient orthopedist Dr. Nikunj Capone, in [facility]. Patient thereafter began to have increased pain in both knees and returned to [facility] ED and was admitted for JUDITH placement. Patient endorses that she usually uses a walker to ambulate. Patient states that during her admission she had xrays and an MRI done, which demonstrated torn meniscus bilaterally. Patient denies any aspiration of fluid from either knee during admission. Patient was discharged to [facility]. Patient presents to [facility] ED today due to increased pain and paresthesias in RLE, specifcally the right foot. Patient states that it has worsened since coming to [facility]. Patient has not ambulated well since her shots. Patient is able to bear weight on BLLE for transfers and with assistance. Denies outpatient fevers/chills. Endorses numbness/tingling in the RLE. Denies any other recent trauma/injuries/sugery/injections. Denies hx of crystal arthropathy or other inflammatory joint disorders, IV drug use, new sexual encounters/STIs, or other infections.    ROS  Negative unless otherwise specified in HPI.    PAST MEDICAL & SURGICAL Hx  PAST MEDICAL & SURGICAL HISTORY:  Rheumatoid arthritis      Syncope      DM (diabetes mellitus)      Calculus of gall bladder and bile duct with nonacute cholecystitis with obstruction      Trigeminal neuralgia          MEDICATIONS  Home Medications:  alendronate 70 mg oral tablet: 1 tab(s) orally once a week (06 Apr 2025 16:09)  aspirin:  (06 Apr 2025 16:09)  aspirin 81 mg oral tablet, chewable: 1 tab(s) chewed once a day (06 Apr 2025 16:09)  atorvastatin 80 mg oral tablet: 1 tab(s) orally once a day (06 Apr 2025 16:09)  azelastine 0.05% ophthalmic solution: 1 drop(s) in each affected eye 2 times a day (06 Apr 2025 16:09)  azelastine 137 mcg/inh (0.1%) nasal spray: 1 spray(s) in each nostril once a day as needed for  bronchospasm (06 Apr 2025 16:09)  famotidine 20 mg oral tablet: 1 tab(s) orally 2 times a day (06 Apr 2025 16:09)  fluticasone furoate 50 mcg/inh inhalation powder: 1 inhaled once a day (06 Apr 2025 16:09)  hydroxychloroquine 200 mg oral tablet: 1 tab(s) orally once a day (06 Apr 2025 16:09)  meclizine 25 mg oral tablet: 1 tab(s) orally as needed for  dizziness (06 Apr 2025 16:09)  predniSONE 1 mg oral tablet: 2 tab(s) orally once a day (06 Apr 2025 16:09)  simethicone:  (06 Apr 2025 16:09)  simethicone 80 mg oral tablet, chewable: 1 tab(s) chewed 4 times a day as needed for  heartburn (06 Apr 2025 16:09)  sulfaSALAzine 500 mg oral tablet: 2 tab(s) orally 2 times a day (06 Apr 2025 16:09)      ALLERGIES  Allergy Status Unknown      FAMILY Hx  FAMILY HISTORY:      SOCIAL Hx  Social History:      VITALS  Vital Signs Last 24 Hrs  T(C): 37 (06 Apr 2025 19:20), Max: 37 (06 Apr 2025 19:20)  T(F): 98.6 (06 Apr 2025 19:20), Max: 98.6 (06 Apr 2025 19:20)  HR: 73 (06 Apr 2025 19:20) (73 - 94)  BP: 148/67 (06 Apr 2025 19:20) (134/70 - 155/69)  BP(mean): --  RR: 17 (06 Apr 2025 19:20) (17 - 19)  SpO2: 100% (06 Apr 2025 19:20) (100% - 100%)    Parameters below as of 06 Apr 2025 19:20  Patient On (Oxygen Delivery Method): room air        PHYSICAL EXAM  Gen: Lying in bed, non-toxic appearing, NAD  Resp: No increased WOB  RLE:  Foot slightly discolored and slightly colder than rest of limb, however similar to contralateral side  Skin intact, minimal effusion over R knee  +mild TTP over R knee, warmth similar to contralateral side; compartments soft  R knee passive ROM limited 2/2 pain  Motor: TA/EHL/GS/FHL intact  Sensory: DP/SP/Tib/Kaleigh/Saph SILT  +DP pulse, WWP    LLE:  Foot slightly discolored and slightly colder than rest of limb, however similar to contralateral side  Skin intact, minimal effusion over R knee  +mild TTP over L knee, warmth similar to contralateral side; compartments soft  L knee passive ROM limited 2/2 pain, but better than R Knee  Motor: TA/EHL/GS/FHL intact  Sensory: DP/SP/Tib/Kaleigh/Saph SILT  +DP pulse, WWP      LABS                        11.1   4.95  )-----------( 255      ( 06 Apr 2025 16:02 )             33.4     04-06    131[L]  |  98  |  13  ----------------------------<  105[H]  5.2   |  28  |  0.85    Ca    10.0      06 Apr 2025 16:02    TPro  7.2  /  Alb  3.3  /  TBili  0.5  /  DBili  x   /  AST  61[H]  /  ALT  48  /  AlkPhos  131[H]  04-06    PT/INR - ( 06 Apr 2025 16:02 )   PT: 11.6 sec;   INR: 1.03 ratio         PTT - ( 06 Apr 2025 16:02 )  PTT:33.6 sec      Urinalysis with Rflx Culture (collected 06 Apr 2025 16:21)    IMAGING  XRs: BL knee minimal effusion present, but no acute fx or dislocation (personal read)    ASSESSMENT & PLAN  74yFemale w/ BL knee pain s/p gel shots outpatient last week. ESR pending, CRP pending. Low suspicion for septic arthritis.  -WBAT, ACE wrap PRN  -f/u ESR and CRP  -keep NPO until ESR/CRP result, which will determine if knees will be aspirated or not.   -no acute ortho surgery at this time  -pain control  -ice/cold compress, elevation  -Final plan pending ESR/CRP results  -Plan discussed with Dr. Gamble, who agrees with above  HPI  74yFemale w/ a c/o bilateral knee pain since her gel shots in both knees last week. Patient had gel shots done for both knees at outpatient orthopedist Dr. Nikunj Capone, in [facility]. Patient thereafter began to have increased pain in both knees and returned to [facility] ED and was admitted for JUDITH placement. Patient endorses that she usually uses a walker to ambulate. Patient states that during her admission she had xrays and an MRI done, which demonstrated torn meniscus bilaterally. Patient denies any aspiration of fluid from either knee during admission. Patient was discharged to [facility]. Patient presents to [facility] ED today due to increased pain and paresthesias in RLE, specifcally the right foot. Patient states that it has worsened since coming to [facility]. Patient has not ambulated well since her shots. Patient is able to bear weight on BLLE for transfers and with assistance. Denies outpatient fevers/chills. Endorses numbness/tingling in the RLE. Denies any other recent trauma/injuries/sugery/injections. Denies hx of crystal arthropathy or other inflammatory joint disorders, IV drug use, new sexual encounters/STIs, or other infections.    ROS  Negative unless otherwise specified in HPI.    PAST MEDICAL & SURGICAL Hx  PAST MEDICAL & SURGICAL HISTORY:  Rheumatoid arthritis      Syncope      DM (diabetes mellitus)      Calculus of gall bladder and bile duct with nonacute cholecystitis with obstruction      Trigeminal neuralgia          MEDICATIONS  Home Medications:  alendronate 70 mg oral tablet: 1 tab(s) orally once a week (06 Apr 2025 16:09)  aspirin:  (06 Apr 2025 16:09)  aspirin 81 mg oral tablet, chewable: 1 tab(s) chewed once a day (06 Apr 2025 16:09)  atorvastatin 80 mg oral tablet: 1 tab(s) orally once a day (06 Apr 2025 16:09)  azelastine 0.05% ophthalmic solution: 1 drop(s) in each affected eye 2 times a day (06 Apr 2025 16:09)  azelastine 137 mcg/inh (0.1%) nasal spray: 1 spray(s) in each nostril once a day as needed for  bronchospasm (06 Apr 2025 16:09)  famotidine 20 mg oral tablet: 1 tab(s) orally 2 times a day (06 Apr 2025 16:09)  fluticasone furoate 50 mcg/inh inhalation powder: 1 inhaled once a day (06 Apr 2025 16:09)  hydroxychloroquine 200 mg oral tablet: 1 tab(s) orally once a day (06 Apr 2025 16:09)  meclizine 25 mg oral tablet: 1 tab(s) orally as needed for  dizziness (06 Apr 2025 16:09)  predniSONE 1 mg oral tablet: 2 tab(s) orally once a day (06 Apr 2025 16:09)  simethicone:  (06 Apr 2025 16:09)  simethicone 80 mg oral tablet, chewable: 1 tab(s) chewed 4 times a day as needed for  heartburn (06 Apr 2025 16:09)  sulfaSALAzine 500 mg oral tablet: 2 tab(s) orally 2 times a day (06 Apr 2025 16:09)      ALLERGIES  Allergy Status Unknown      FAMILY Hx  FAMILY HISTORY:      SOCIAL Hx  Social History:      VITALS  Vital Signs Last 24 Hrs  T(C): 37 (06 Apr 2025 19:20), Max: 37 (06 Apr 2025 19:20)  T(F): 98.6 (06 Apr 2025 19:20), Max: 98.6 (06 Apr 2025 19:20)  HR: 73 (06 Apr 2025 19:20) (73 - 94)  BP: 148/67 (06 Apr 2025 19:20) (134/70 - 155/69)  BP(mean): --  RR: 17 (06 Apr 2025 19:20) (17 - 19)  SpO2: 100% (06 Apr 2025 19:20) (100% - 100%)    Parameters below as of 06 Apr 2025 19:20  Patient On (Oxygen Delivery Method): room air        PHYSICAL EXAM  Gen: Lying in bed, non-toxic appearing, NAD  Resp: No increased WOB  RLE:  Foot slightly discolored and slightly colder than rest of limb, however similar to contralateral side  Skin intact, minimal effusion over R knee  +mild TTP over R knee, warmth similar to contralateral side; compartments soft  R knee passive ROM limited 2/2 pain  Motor: TA/EHL/GS/FHL intact  Sensory: DP/SP/Tib/Kaleigh/Saph SILT  +DP pulse, WWP    LLE:  Foot slightly discolored and slightly colder than rest of limb, however similar to contralateral side  Skin intact, minimal effusion over L knee  +mild TTP over L knee, warmth similar to contralateral side; compartments soft  L knee passive ROM limited 2/2 pain, but better than R Knee  Motor: TA/EHL/GS/FHL intact  Sensory: DP/SP/Tib/Kaleigh/Saph SILT  +DP pulse, WWP      LABS                        11.1   4.95  )-----------( 255      ( 06 Apr 2025 16:02 )             33.4     04-06    131[L]  |  98  |  13  ----------------------------<  105[H]  5.2   |  28  |  0.85    Ca    10.0      06 Apr 2025 16:02    TPro  7.2  /  Alb  3.3  /  TBili  0.5  /  DBili  x   /  AST  61[H]  /  ALT  48  /  AlkPhos  131[H]  04-06    PT/INR - ( 06 Apr 2025 16:02 )   PT: 11.6 sec;   INR: 1.03 ratio         PTT - ( 06 Apr 2025 16:02 )  PTT:33.6 sec      Urinalysis with Rflx Culture (collected 06 Apr 2025 16:21)    IMAGING  XRs: BL knee minimal effusion present, but no acute fx or dislocation (personal read)    ASSESSMENT & PLAN  74yFemale w/ BL knee pain s/p gel shots outpatient last week. ESR pending, CRP pending. Low suspicion for septic arthritis.  -WBAT, ACE wrap PRN  -f/u ESR and CRP  -keep NPO until ESR/CRP result, which will determine if knees will be aspirated or not.   -no acute ortho surgery at this time  -pain control  -ice/cold compress, elevation  -Final plan pending ESR/CRP results  -Plan discussed with Dr. Gamble, who agrees with above  HPI  74yFemale w/ a c/o bilateral knee pain since her gel shots in both knees last week. Patient had gel shots done for both knees at outpatient orthopedist Dr. Nikunj Capone, in [facility]. Patient thereafter began to have increased pain in both knees and returned to [facility] ED and was admitted for JUDITH placement. Patient endorses that she usually uses a walker to ambulate. Patient states that during her admission she had xrays and an MRI done, which demonstrated torn meniscus bilaterally. Patient denies any aspiration of fluid from either knee during admission. Patient was discharged to [facility]. Patient presents to [facility] ED today due to increased pain and paresthesias in RLE, specifcally the right foot. Patient states that it has worsened since coming to [facility]. Patient has not ambulated well since her shots. Patient is able to bear weight on BLLE for transfers and with assistance. Denies outpatient fevers/chills. Endorses numbness/tingling in the RLE. Denies any other recent trauma/injuries/sugery/injections. Denies hx of crystal arthropathy or other inflammatory joint disorders, IV drug use, new sexual encounters/STIs, or other infections.    ROS  Negative unless otherwise specified in HPI.    PAST MEDICAL & SURGICAL Hx  PAST MEDICAL & SURGICAL HISTORY:  Rheumatoid arthritis      Syncope      DM (diabetes mellitus)      Calculus of gall bladder and bile duct with nonacute cholecystitis with obstruction      Trigeminal neuralgia          MEDICATIONS  Home Medications:  alendronate 70 mg oral tablet: 1 tab(s) orally once a week (06 Apr 2025 16:09)  aspirin:  (06 Apr 2025 16:09)  aspirin 81 mg oral tablet, chewable: 1 tab(s) chewed once a day (06 Apr 2025 16:09)  atorvastatin 80 mg oral tablet: 1 tab(s) orally once a day (06 Apr 2025 16:09)  azelastine 0.05% ophthalmic solution: 1 drop(s) in each affected eye 2 times a day (06 Apr 2025 16:09)  azelastine 137 mcg/inh (0.1%) nasal spray: 1 spray(s) in each nostril once a day as needed for  bronchospasm (06 Apr 2025 16:09)  famotidine 20 mg oral tablet: 1 tab(s) orally 2 times a day (06 Apr 2025 16:09)  fluticasone furoate 50 mcg/inh inhalation powder: 1 inhaled once a day (06 Apr 2025 16:09)  hydroxychloroquine 200 mg oral tablet: 1 tab(s) orally once a day (06 Apr 2025 16:09)  meclizine 25 mg oral tablet: 1 tab(s) orally as needed for  dizziness (06 Apr 2025 16:09)  predniSONE 1 mg oral tablet: 2 tab(s) orally once a day (06 Apr 2025 16:09)  simethicone:  (06 Apr 2025 16:09)  simethicone 80 mg oral tablet, chewable: 1 tab(s) chewed 4 times a day as needed for  heartburn (06 Apr 2025 16:09)  sulfaSALAzine 500 mg oral tablet: 2 tab(s) orally 2 times a day (06 Apr 2025 16:09)      ALLERGIES  Allergy Status Unknown      FAMILY Hx  FAMILY HISTORY:      SOCIAL Hx  Social History:      VITALS  Vital Signs Last 24 Hrs  T(C): 37 (06 Apr 2025 19:20), Max: 37 (06 Apr 2025 19:20)  T(F): 98.6 (06 Apr 2025 19:20), Max: 98.6 (06 Apr 2025 19:20)  HR: 73 (06 Apr 2025 19:20) (73 - 94)  BP: 148/67 (06 Apr 2025 19:20) (134/70 - 155/69)  BP(mean): --  RR: 17 (06 Apr 2025 19:20) (17 - 19)  SpO2: 100% (06 Apr 2025 19:20) (100% - 100%)    Parameters below as of 06 Apr 2025 19:20  Patient On (Oxygen Delivery Method): room air        PHYSICAL EXAM  Gen: Lying in bed, non-toxic appearing, NAD  Resp: No increased WOB  RLE:  Foot slightly discolored and slightly colder than rest of limb, however similar to contralateral side  Skin intact, minimal effusion over R knee  +mild TTP over R knee, warmth similar to contralateral side; compartments soft  R knee passive ROM limited 2/2 pain  Motor: TA/EHL/GS/FHL intact  Sensory: DP/SP/Tib/Kaleigh/Saph SILT  +DP pulse, WWP    LLE:  Foot slightly discolored and slightly colder than rest of limb, however similar to contralateral side  Skin intact, minimal effusion over L knee  +mild TTP over L knee, warmth similar to contralateral side; compartments soft  L knee passive ROM limited 2/2 pain, but better than R Knee  Motor: TA/EHL/GS/FHL intact  Sensory: DP/SP/Tib/Kaleigh/Saph SILT  +DP pulse, WWP      LABS                        11.1   4.95  )-----------( 255      ( 06 Apr 2025 16:02 )             33.4     04-06    131[L]  |  98  |  13  ----------------------------<  105[H]  5.2   |  28  |  0.85    Ca    10.0      06 Apr 2025 16:02    TPro  7.2  /  Alb  3.3  /  TBili  0.5  /  DBili  x   /  AST  61[H]  /  ALT  48  /  AlkPhos  131[H]  04-06    PT/INR - ( 06 Apr 2025 16:02 )   PT: 11.6 sec;   INR: 1.03 ratio         PTT - ( 06 Apr 2025 16:02 )  PTT:33.6 sec      Urinalysis with Rflx Culture (collected 06 Apr 2025 16:21)    IMAGING  XRs: BL knee minimal effusion present, but no acute fx or dislocation (personal read)    ASSESSMENT & PLAN  74yFemale w/ BL knee pain s/p gel shots outpatient last week. ESR 25, CRP pending. Low suspicion for septic arthritis.  -WBAT, ACE wrap PRN  -f/u CRP  -no acute ortho surgery at this time  -no aspiration at this time given low clinical suspicion of septic joints  -recommend NSAIDs  -pain control  -ice/cold compress, elevation  -FU Outpatient with original orthopedist or Dr. Gamble, whomever patient prefers  -Plan discussed with Dr. Gamble, who agrees with above

## 2025-04-07 VITALS
HEART RATE: 66 BPM | DIASTOLIC BLOOD PRESSURE: 76 MMHG | TEMPERATURE: 98 F | OXYGEN SATURATION: 100 % | SYSTOLIC BLOOD PRESSURE: 159 MMHG | RESPIRATION RATE: 16 BRPM

## 2025-04-07 LAB — CRP SERPL-MCNC: 4 MG/L — SIGNIFICANT CHANGE UP

## 2025-04-07 RX ORDER — KETOROLAC TROMETHAMINE 30 MG/ML
15 INJECTION, SOLUTION INTRAMUSCULAR; INTRAVENOUS ONCE
Refills: 0 | Status: DISCONTINUED | OUTPATIENT
Start: 2025-04-07 | End: 2025-04-07

## 2025-04-07 RX ADMIN — KETOROLAC TROMETHAMINE 15 MILLIGRAM(S): 30 INJECTION, SOLUTION INTRAMUSCULAR; INTRAVENOUS at 02:22

## 2025-04-07 NOTE — ED PROVIDER NOTE - CARE PROVIDER_API CALL
Godwin Gamble  Adult Reconstructive Orthopaedic Surgery  [address], [address]  [place] [address]  Phone: [phone]  Fax: [phone]  Follow Up Time: Routine

## 2025-04-07 NOTE — ED PROVIDER NOTE - PATIENT PORTAL LINK FT
You can access the FollowMyHealth Patient Portal offered by [facility] by registering at the following website: [email or web address]. By joining [employer]’s FollowMyHealth portal, you will also be able to view your health information using other applications (apps) compatible with our system.

## 2025-04-07 NOTE — ED PROVIDER NOTE - OBJECTIVE STATEMENT
Patient with PMH of RA, HTN, HLD, hemorraghic CVA  who presents from rehab due to concern for pain, paresthesias and color change of RLE. The patient states that she recently had gel shots in b/l knees done at an [facility]. After the shots the patient had increasing pain and trouble ambulating and was then admitted to [facility]. The patient's nurse and family became concerned because of RLE paresthesias and pain. Patient states she can bear some weight on LLE and was working on rehab to further strengthen rehab. The patient denies any joint swelling, fever, chills, nausea, vomiting. Denies hx of gout or pseudo gout. Denies any recent falls or traumas. Patient denies any hx of vascular disease such as PVD, CAD.

## 2025-04-07 NOTE — ED PROVIDER NOTE - NSICDXPASTMEDICALHX_GEN_ALL_CORE_FT
PAST MEDICAL HISTORY:  Calculus of gall bladder and bile duct with nonacute cholecystitis with obstruction     DM (diabetes mellitus)     Rheumatoid arthritis     Syncope     Trigeminal neuralgia

## 2025-04-07 NOTE — ED PROVIDER NOTE - PHYSICAL EXAMINATION
MSK    Head: Atraumatic    Spine: No cervical, lumbar or thoracic midline back pain, no paravertebral tenderness    Upper extremities: 5/5 strength in b/l upper extremities, strong equal radial pulses in b/l upper extremities. 5/5 strength in b/l hands, no paresthesias or loss of sensation in upper extremities.    Lower extremities  (+) b/l tenderness to palpation of knees, without joint ROM at knees is full however pain over ROM    (+) RLE discoloration of distal right foot specifically of all 5 digits and small portion of dorsal foot, delayed capillary refill in digits and distal foot, decreased sensation over bottom of toes on right foot. No ankle joint swelling, no erythema or warmth over foot or ankle    LLE: Normal color, normal capillary refill and sensation in LLE    Vascular:  Strong and equal DP and PT pulses in b/l lower extremities

## 2025-04-07 NOTE — ED PROVIDER NOTE - PROGRESS NOTE DETAILS
MD Shannan: Patient received on sign-out from Dr. Hu awaiting vascular surgery and orthopedic surgery recommendations. No acute emergent recommendations currently. Patient able to range b/l LE. discoloration and coldness over forefoot and toes. good pulses, no evidence of DVT. will give pain control and apply heat to distal extremities. no concern for infectious pathology at the time. concern for raynaud phenomenom vs cryoglobulinemia. will d/c to [facility] w/ outpatient follow-up

## 2025-04-07 NOTE — ED PROVIDER NOTE - CLINICAL SUMMARY MEDICAL DECISION MAKING FREE TEXT BOX
HPI and PMH as above  Given patient's presentation initial concern was for vascular emergency such as arterial occlusion of RLE.     Emergent POCUS done showing strong color flow of pulses at b/l DP and popliteal arteries.     No joint swelling, warmth, or erythema noted at knee, ankle joints bilaterally. No effusion palpated at lower extremity joints    Vascular consulted, CTA ordered of lower extremities  Orthopedics also consulted given recent gel shot injection to b/l knees    CTA showed patent arteries in b/l lower extremities.  Given patent arteries, strong doppler pulses lower suspicion for acute vascular emergency.     Discussion with vascular for further studies and US DVT and arterial studies ordered.     Patient signed out pending US studies and final vascular and orthopedics recommendations and final disposition, likely back to SNF with PCP and ortho/vascular followup.     -Dr. Hu

## 2025-04-07 NOTE — ED PROVIDER NOTE - CONDITION AT DISCHARGE:
Pt last saw PCP 1/11/22 for an acute visit. Pending appt 6/3/22 [facility]    ALPRAZolam (XANAX) 0.25 MG tablets last ordered 3/2/22 #12 tablets R0  Take 1 tablet by mouth 1 time as needed (45 mins prior to air travel).    PDMP:  MME: 0  Pt has only had one controlled Rx as above prescribed recently one time, from one authorizing provider. Criteria met.    OK to refill as previous?   Improved

## 2025-04-12 LAB
CULTURE RESULTS: SIGNIFICANT CHANGE UP
CULTURE RESULTS: SIGNIFICANT CHANGE UP
SPECIMEN SOURCE: SIGNIFICANT CHANGE UP
SPECIMEN SOURCE: SIGNIFICANT CHANGE UP

## 2025-04-18 PROBLEM — K80.61: Chronic | Status: ACTIVE | Noted: 2025-04-06

## 2025-04-18 PROBLEM — R55 SYNCOPE AND COLLAPSE: Chronic | Status: ACTIVE | Noted: 2025-04-06

## 2025-04-18 PROBLEM — G50.0 TRIGEMINAL NEURALGIA: Chronic | Status: ACTIVE | Noted: 2025-04-06

## 2025-04-18 PROBLEM — M06.9 RHEUMATOID ARTHRITIS, UNSPECIFIED: Chronic | Status: ACTIVE | Noted: 2025-04-06

## 2025-04-18 PROBLEM — E11.9 TYPE 2 DIABETES MELLITUS WITHOUT COMPLICATIONS: Chronic | Status: ACTIVE | Noted: 2025-04-06

## 2025-04-26 ENCOUNTER — NON-APPOINTMENT (OUTPATIENT)
Age: 75
End: 2025-04-26

## 2025-04-28 ENCOUNTER — APPOINTMENT (OUTPATIENT)
Dept: ORTHOPEDIC SURGERY | Facility: CLINIC | Age: 75
End: 2025-04-28
Payer: MEDICARE

## 2025-04-28 ENCOUNTER — NON-APPOINTMENT (OUTPATIENT)
Age: 75
End: 2025-04-28

## 2025-04-28 VITALS — BODY MASS INDEX: 27.61 KG/M2 | WEIGHT: 128 LBS | HEIGHT: 57 IN

## 2025-04-28 DIAGNOSIS — M17.0 BILATERAL PRIMARY OSTEOARTHRITIS OF KNEE: ICD-10-CM

## 2025-04-28 DIAGNOSIS — G57.93 UNSPECIFIED MONONEUROPATHY OF BILATERAL LOWER LIMBS: ICD-10-CM

## 2025-04-28 DIAGNOSIS — M25.562 PAIN IN RIGHT KNEE: ICD-10-CM

## 2025-04-28 DIAGNOSIS — M25.561 PAIN IN RIGHT KNEE: ICD-10-CM

## 2025-04-28 PROCEDURE — G2211 COMPLEX E/M VISIT ADD ON: CPT

## 2025-04-28 PROCEDURE — 73564 X-RAY EXAM KNEE 4 OR MORE: CPT | Mod: 50

## 2025-04-28 PROCEDURE — 99204 OFFICE O/P NEW MOD 45 MIN: CPT

## 2025-04-28 RX ORDER — METHYLPREDNISOLONE 4 MG/1
4 TABLET ORAL
Qty: 1 | Refills: 0 | Status: ACTIVE | COMMUNITY
Start: 2025-04-28 | End: 1900-01-01

## 2025-05-07 ENCOUNTER — APPOINTMENT (OUTPATIENT)
Dept: ORTHOPEDIC SURGERY | Facility: CLINIC | Age: 75
End: 2025-05-07
Payer: OTHER MISCELLANEOUS

## 2025-05-07 ENCOUNTER — APPOINTMENT (OUTPATIENT)
Dept: ORTHOPEDIC SURGERY | Facility: CLINIC | Age: 75
End: 2025-05-07

## 2025-05-07 VITALS — HEIGHT: 59 IN | WEIGHT: 129 LBS | BODY MASS INDEX: 26 KG/M2

## 2025-05-07 DIAGNOSIS — M25.511 PAIN IN RIGHT SHOULDER: ICD-10-CM

## 2025-05-07 DIAGNOSIS — G89.29 PAIN IN RIGHT SHOULDER: ICD-10-CM

## 2025-05-07 DIAGNOSIS — M54.12 RADICULOPATHY, CERVICAL REGION: ICD-10-CM

## 2025-05-07 PROCEDURE — 99203 OFFICE O/P NEW LOW 30 MIN: CPT

## 2025-05-07 PROCEDURE — 73030 X-RAY EXAM OF SHOULDER: CPT | Mod: RT

## 2025-05-07 PROCEDURE — 72040 X-RAY EXAM NECK SPINE 2-3 VW: CPT

## 2025-06-11 ENCOUNTER — APPOINTMENT (OUTPATIENT)
Dept: ORTHOPEDIC SURGERY | Facility: CLINIC | Age: 75
End: 2025-06-11
Payer: MEDICARE

## 2025-06-11 PROCEDURE — G2211 COMPLEX E/M VISIT ADD ON: CPT

## 2025-06-11 PROCEDURE — 99213 OFFICE O/P EST LOW 20 MIN: CPT

## 2025-06-18 ENCOUNTER — APPOINTMENT (OUTPATIENT)
Dept: ORTHOPEDIC SURGERY | Facility: CLINIC | Age: 75
End: 2025-06-18
Payer: OTHER MISCELLANEOUS

## 2025-06-18 VITALS — WEIGHT: 128 LBS | BODY MASS INDEX: 25.8 KG/M2 | HEIGHT: 59 IN

## 2025-06-18 PROCEDURE — 99213 OFFICE O/P EST LOW 20 MIN: CPT

## 2025-06-19 RX ORDER — LIDOCAINE 5% 700 MG/1
5 PATCH TOPICAL
Qty: 30 | Refills: 2 | Status: ACTIVE | COMMUNITY
Start: 2025-06-19 | End: 1900-01-01

## 2025-07-09 ENCOUNTER — NON-APPOINTMENT (OUTPATIENT)
Age: 75
End: 2025-07-09

## 2025-09-17 ENCOUNTER — APPOINTMENT (OUTPATIENT)
Dept: ORTHOPEDIC SURGERY | Facility: CLINIC | Age: 75
End: 2025-09-17
Payer: MEDICARE

## 2025-09-17 VITALS — WEIGHT: 126 LBS | BODY MASS INDEX: 25.4 KG/M2 | HEIGHT: 59 IN

## 2025-09-17 DIAGNOSIS — M17.0 BILATERAL PRIMARY OSTEOARTHRITIS OF KNEE: ICD-10-CM

## 2025-09-17 PROCEDURE — G2211 COMPLEX E/M VISIT ADD ON: CPT

## 2025-09-17 PROCEDURE — 73564 X-RAY EXAM KNEE 4 OR MORE: CPT | Mod: 50

## 2025-09-17 PROCEDURE — 99213 OFFICE O/P EST LOW 20 MIN: CPT

## 2025-09-17 RX ORDER — LIDOCAINE 5% 700 MG/1
5 PATCH TOPICAL
Qty: 60 | Refills: 0 | Status: ACTIVE | COMMUNITY
Start: 2025-09-17 | End: 1900-01-01

## 2025-09-21 ENCOUNTER — NON-APPOINTMENT (OUTPATIENT)
Age: 75
End: 2025-09-21